# Patient Record
Sex: FEMALE | Race: WHITE | NOT HISPANIC OR LATINO | Employment: UNEMPLOYED
[De-identification: names, ages, dates, MRNs, and addresses within clinical notes are randomized per-mention and may not be internally consistent; named-entity substitution may affect disease eponyms.]

---

## 2017-02-17 ENCOUNTER — CHARTING TRANS (OUTPATIENT)
Dept: OTHER | Age: 57
End: 2017-02-17

## 2017-02-18 ENCOUNTER — LAB SERVICES (OUTPATIENT)
Dept: OTHER | Age: 57
End: 2017-02-18

## 2017-02-19 ENCOUNTER — CHARTING TRANS (OUTPATIENT)
Dept: OTHER | Age: 57
End: 2017-02-19

## 2017-02-19 LAB
CHOLEST SERPL-MCNC: 176 MG/DL
CHOLEST/HDLC SERPL: 3.7
HDLC SERPL-MCNC: 48 MG/DL
LDLC SERPL CALC-MCNC: 111 MG/DL
LENGTH OF FAST TIME PATIENT: 12 HRS
NONHDLC SERPL-MCNC: 128 MG/DL
TRIGL SERPL-MCNC: 83 MG/DL

## 2017-06-05 ENCOUNTER — IMAGING SERVICES (OUTPATIENT)
Dept: OTHER | Age: 57
End: 2017-06-05

## 2017-06-05 ENCOUNTER — HOSPITAL (OUTPATIENT)
Dept: OTHER | Age: 57
End: 2017-06-05
Attending: FAMILY MEDICINE

## 2017-10-20 ENCOUNTER — LAB SERVICES (OUTPATIENT)
Dept: OTHER | Age: 57
End: 2017-10-20

## 2017-10-20 ENCOUNTER — CHARTING TRANS (OUTPATIENT)
Dept: OTHER | Age: 57
End: 2017-10-20

## 2017-10-24 ENCOUNTER — CHARTING TRANS (OUTPATIENT)
Dept: OTHER | Age: 57
End: 2017-10-24

## 2017-10-29 ENCOUNTER — CHARTING TRANS (OUTPATIENT)
Dept: OTHER | Age: 57
End: 2017-10-29

## 2017-11-10 LAB
25(OH)D3+25(OH)D2 SERPL-MCNC: 52.5 NG/ML (ref 30–100)
ALBUMIN SERPL-MCNC: 3.6 G/DL (ref 3.6–5.1)
ALBUMIN/GLOB SERPL: 1 (ref 1–2.4)
ALP SERPL-CCNC: 101 UNITS/L (ref 45–117)
ALT SERPL-CCNC: 38 UNITS/L
ANION GAP SERPL CALC-SCNC: 10 MMOL/L (ref 10–20)
AST SERPL-CCNC: 21 UNITS/L
BASOPHILS # BLD: 0 K/MCL (ref 0–0.3)
BASOPHILS NFR BLD: 0 %
BILIRUB SERPL-MCNC: 0.3 MG/DL (ref 0.2–1)
BUN SERPL-MCNC: 21 MG/DL (ref 6–20)
BUN/CREAT SERPL: 24 (ref 7–25)
CALCIUM SERPL-MCNC: 9.8 MG/DL (ref 8.4–10.2)
CHLORIDE SERPL-SCNC: 105 MMOL/L (ref 98–107)
CHOLEST SERPL-MCNC: 162 MG/DL
CHOLEST/HDLC SERPL: 3.2
CO2 SERPL-SCNC: 31 MMOL/L (ref 21–32)
CREAT SERPL-MCNC: 0.87 MG/DL (ref 0.51–0.95)
DIFFERENTIAL METHOD BLD: ABNORMAL
EOSINOPHIL # BLD: 0.2 K/MCL (ref 0.1–0.5)
EOSINOPHIL NFR BLD: 3 %
ERYTHROCYTE [DISTWIDTH] IN BLOOD: 13.2 % (ref 11–15)
GLOBULIN SER-MCNC: 3.5 G/DL (ref 2–4)
GLUCOSE SERPL-MCNC: 94 MG/DL (ref 65–99)
HDLC SERPL-MCNC: 50 MG/DL
HEMATOCRIT: 39.2 % (ref 36–46.5)
HEMOGLOBIN: 12.8 G/DL (ref 12–15.5)
LDLC SERPL CALC-MCNC: 93 MG/DL
LENGTH OF FAST TIME PATIENT: 12 HRS
LENGTH OF FAST TIME PATIENT: 12 HRS
LYMPHOCYTES # BLD: 1.8 K/MCL (ref 1–4)
LYMPHOCYTES NFR BLD: 34 %
MAGNESIUM SERPL-MCNC: 2 MG/DL (ref 1.7–2.4)
MEAN CORPUSCULAR HEMOGLOBIN: 30 PG (ref 26–34)
MEAN CORPUSCULAR HGB CONC: 32.7 G/DL (ref 32–36.5)
MEAN CORPUSCULAR VOLUME: 92 FL (ref 78–100)
MONOCYTES # BLD: 0.4 K/MCL (ref 0.3–0.9)
MONOCYTES NFR BLD: 8 %
NEUTROPHILS # BLD: 2.9 K/MCL (ref 1.8–7.7)
NEUTROPHILS NFR BLD: 55 %
NONHDLC SERPL-MCNC: 112 MG/DL
PAP WITH HIGH RISK HPV: NORMAL
PLATELET COUNT: 239 K/MCL (ref 140–450)
POTASSIUM SERPL-SCNC: 4.9 MMOL/L (ref 3.4–5.1)
RED CELL COUNT: 4.26 MIL/MCL (ref 4–5.2)
SODIUM SERPL-SCNC: 141 MMOL/L (ref 135–145)
TOTAL PROTEIN: 7.1 G/DL (ref 6.4–8.2)
TRIGL SERPL-MCNC: 96 MG/DL
TSH SERPL-ACNC: 1.74 MCUNITS/ML (ref 0.35–5)
URIC ACID: 5.4 MG/DL (ref 2.6–5.9)
WHITE BLOOD COUNT: 5.3 K/MCL (ref 4.2–11)

## 2017-12-02 ENCOUNTER — HOSPITAL (OUTPATIENT)
Dept: OTHER | Age: 57
End: 2017-12-02
Attending: FAMILY MEDICINE

## 2017-12-02 ENCOUNTER — IMAGING SERVICES (OUTPATIENT)
Dept: OTHER | Age: 57
End: 2017-12-02

## 2018-09-27 ENCOUNTER — MYAURORA ACCOUNT LINK (OUTPATIENT)
Dept: OTHER | Age: 58
End: 2018-09-27

## 2018-09-27 ENCOUNTER — CHARTING TRANS (OUTPATIENT)
Dept: OTHER | Age: 58
End: 2018-09-27

## 2018-10-04 ENCOUNTER — CHARTING TRANS (OUTPATIENT)
Dept: OTHER | Age: 58
End: 2018-10-04

## 2018-10-04 LAB — PAP WITH HIGH RISK HPV: NORMAL

## 2018-10-11 ENCOUNTER — LAB SERVICES (OUTPATIENT)
Dept: OTHER | Age: 58
End: 2018-10-11

## 2018-10-11 ENCOUNTER — HOSPITAL (OUTPATIENT)
Dept: OTHER | Age: 58
End: 2018-10-11

## 2018-10-11 ENCOUNTER — IMAGING SERVICES (OUTPATIENT)
Dept: OTHER | Age: 58
End: 2018-10-11

## 2018-10-12 ENCOUNTER — CHARTING TRANS (OUTPATIENT)
Dept: OTHER | Age: 58
End: 2018-10-12

## 2018-10-12 LAB
ALBUMIN SERPL-MCNC: 3.9 G/DL (ref 3.6–5.1)
ALBUMIN/GLOB SERPL: 1.1 (ref 1–2.4)
ALP SERPL-CCNC: 101 UNITS/L (ref 45–117)
ALT SERPL-CCNC: 44 UNITS/L
ANION GAP SERPL CALC-SCNC: 11 MMOL/L (ref 10–20)
AST SERPL-CCNC: 25 UNITS/L
BASOPHILS # BLD: 0 K/MCL (ref 0–0.3)
BASOPHILS NFR BLD: 1 %
BILIRUB SERPL-MCNC: 0.5 MG/DL (ref 0.2–1)
BUN SERPL-MCNC: 25 MG/DL (ref 6–20)
BUN/CREAT SERPL: 26 (ref 7–25)
CALCIUM SERPL-MCNC: 9.4 MG/DL (ref 8.4–10.2)
CHLORIDE SERPL-SCNC: 102 MMOL/L (ref 98–107)
CHOLEST SERPL-MCNC: 186 MG/DL
CHOLEST/HDLC SERPL: 4.1
CO2 SERPL-SCNC: 30 MMOL/L (ref 21–32)
CREAT SERPL-MCNC: 0.98 MG/DL (ref 0.51–0.95)
DIFFERENTIAL METHOD BLD: ABNORMAL
EOSINOPHIL # BLD: 0.2 K/MCL (ref 0.1–0.5)
EOSINOPHIL NFR BLD: 3 %
ERYTHROCYTE [DISTWIDTH] IN BLOOD: 12.8 % (ref 11–15)
GLOBULIN SER-MCNC: 3.6 G/DL (ref 2–4)
GLUCOSE SERPL-MCNC: 84 MG/DL (ref 65–99)
HDLC SERPL-MCNC: 45 MG/DL
HEMATOCRIT: 38.8 % (ref 36–46.5)
HEMOGLOBIN: 12.4 G/DL (ref 12–15.5)
IMM GRANULOCYTES # BLD AUTO: 0 K/MCL (ref 0–0.2)
IMM GRANULOCYTES NFR BLD: 0 %
LDLC SERPL CALC-MCNC: 122 MG/DL
LENGTH OF FAST TIME PATIENT: 12 HRS
LENGTH OF FAST TIME PATIENT: 12 HRS
LYMPHOCYTES # BLD: 1.7 K/MCL (ref 1–4)
LYMPHOCYTES NFR BLD: 32 %
MEAN CORPUSCULAR HEMOGLOBIN: 29.7 PG (ref 26–34)
MEAN CORPUSCULAR HGB CONC: 32 G/DL (ref 32–36.5)
MEAN CORPUSCULAR VOLUME: 92.8 FL (ref 78–100)
MONOCYTES # BLD: 0.4 K/MCL (ref 0.3–0.9)
MONOCYTES NFR BLD: 8 %
NEUTROPHILS # BLD: 3 K/MCL (ref 1.8–7.7)
NEUTROPHILS NFR BLD: 56 %
NONHDLC SERPL-MCNC: 141 MG/DL
NRBC (NRBCRE): 0 /100 WBC
PLATELET COUNT: 262 K/MCL (ref 140–450)
POTASSIUM SERPL-SCNC: 4.2 MMOL/L (ref 3.4–5.1)
RED CELL COUNT: 4.18 MIL/MCL (ref 4–5.2)
SODIUM SERPL-SCNC: 139 MMOL/L (ref 135–145)
TOTAL PROTEIN: 7.5 G/DL (ref 6.4–8.2)
TRIGL SERPL-MCNC: 97 MG/DL
TSH SERPL-ACNC: 1.79 MCUNITS/ML (ref 0.35–5)
WHITE BLOOD COUNT: 5.3 K/MCL (ref 4.2–11)

## 2018-11-02 VITALS
WEIGHT: 158.29 LBS | HEIGHT: 64 IN | HEART RATE: 56 BPM | SYSTOLIC BLOOD PRESSURE: 122 MMHG | TEMPERATURE: 98.1 F | OXYGEN SATURATION: 96 % | BODY MASS INDEX: 27.02 KG/M2 | RESPIRATION RATE: 16 BRPM | DIASTOLIC BLOOD PRESSURE: 74 MMHG

## 2018-11-27 VITALS
TEMPERATURE: 98.3 F | RESPIRATION RATE: 16 BRPM | SYSTOLIC BLOOD PRESSURE: 110 MMHG | HEIGHT: 64 IN | HEART RATE: 72 BPM | WEIGHT: 159 LBS | DIASTOLIC BLOOD PRESSURE: 72 MMHG | BODY MASS INDEX: 27.14 KG/M2

## 2018-12-07 ENCOUNTER — HOSPITAL (OUTPATIENT)
Dept: OTHER | Age: 58
End: 2018-12-07
Attending: FAMILY MEDICINE

## 2019-01-01 ENCOUNTER — EXTERNAL RECORD (OUTPATIENT)
Dept: HEALTH INFORMATION MANAGEMENT | Facility: OTHER | Age: 59
End: 2019-01-01

## 2019-07-31 ENCOUNTER — TELEPHONE (OUTPATIENT)
Dept: SCHEDULING | Age: 59
End: 2019-07-31

## 2019-09-03 ENCOUNTER — LAB SERVICES (OUTPATIENT)
Dept: LAB | Age: 59
End: 2019-09-03

## 2019-09-03 ENCOUNTER — OFFICE VISIT (OUTPATIENT)
Dept: FAMILY MEDICINE | Age: 59
End: 2019-09-03

## 2019-09-03 VITALS
RESPIRATION RATE: 16 BRPM | DIASTOLIC BLOOD PRESSURE: 64 MMHG | WEIGHT: 157 LBS | HEART RATE: 68 BPM | BODY MASS INDEX: 26.8 KG/M2 | HEIGHT: 64 IN | TEMPERATURE: 97.5 F | SYSTOLIC BLOOD PRESSURE: 110 MMHG

## 2019-09-03 DIAGNOSIS — Z12.39 SCREENING FOR BREAST CANCER: Primary | ICD-10-CM

## 2019-09-03 DIAGNOSIS — I10 ESSENTIAL HYPERTENSION: ICD-10-CM

## 2019-09-03 DIAGNOSIS — Z12.11 SCREENING FOR COLON CANCER: ICD-10-CM

## 2019-09-03 DIAGNOSIS — Z00.00 ENCOUNTER FOR GENERAL ADULT MEDICAL EXAMINATION W/O ABNORMAL FINDINGS: ICD-10-CM

## 2019-09-03 LAB
ALBUMIN SERPL-MCNC: 3.5 G/DL (ref 3.6–5.1)
ALBUMIN/GLOB SERPL: 1 {RATIO} (ref 1–2.4)
ALP SERPL-CCNC: 90 UNITS/L (ref 45–117)
ALT SERPL-CCNC: 43 UNITS/L
ANION GAP SERPL CALC-SCNC: 11 MMOL/L (ref 10–20)
AST SERPL-CCNC: 27 UNITS/L
BASOPHILS # BLD AUTO: 0 K/MCL (ref 0–0.3)
BASOPHILS NFR BLD AUTO: 1 %
BILIRUB SERPL-MCNC: 0.5 MG/DL (ref 0.2–1)
BUN SERPL-MCNC: 19 MG/DL (ref 6–20)
BUN/CREAT SERPL: 24 (ref 7–25)
CALCIUM SERPL-MCNC: 9.4 MG/DL (ref 8.4–10.2)
CHLORIDE SERPL-SCNC: 104 MMOL/L (ref 98–107)
CHOLEST SERPL-MCNC: 188 MG/DL
CHOLEST/HDLC SERPL: 3.8 {RATIO}
CO2 SERPL-SCNC: 29 MMOL/L (ref 21–32)
CREAT SERPL-MCNC: 0.8 MG/DL (ref 0.51–0.95)
DIFFERENTIAL METHOD BLD: NORMAL
EOSINOPHIL # BLD AUTO: 0.2 K/MCL (ref 0.1–0.5)
EOSINOPHIL NFR SPEC: 3 %
ERYTHROCYTE [DISTWIDTH] IN BLOOD: 12.8 % (ref 11–15)
FASTING STATUS PATIENT QL REPORTED: 12 HRS
GLOBULIN SER-MCNC: 3.4 G/DL (ref 2–4)
GLUCOSE SERPL-MCNC: 81 MG/DL (ref 65–99)
HCT VFR BLD CALC: 39.5 % (ref 36–46.5)
HDLC SERPL-MCNC: 50 MG/DL
HGB BLD-MCNC: 12.9 G/DL (ref 12–15.5)
IMM GRANULOCYTES # BLD AUTO: 0 K/MCL (ref 0–0.2)
IMM GRANULOCYTES NFR BLD: 0 %
LDLC SERPL-MCNC: 120 MG/DL
LENGTH OF FAST TIME PATIENT: 12 HRS
LYMPHOCYTES # BLD MANUAL: 1.5 K/MCL (ref 1–4)
LYMPHOCYTES NFR BLD MANUAL: 25 %
MCH RBC QN AUTO: 30.7 PG (ref 26–34)
MCHC RBC AUTO-ENTMCNC: 32.7 G/DL (ref 32–36.5)
MCV RBC AUTO: 94 FL (ref 78–100)
MONOCYTES # BLD MANUAL: 0.5 K/MCL (ref 0.3–0.9)
MONOCYTES NFR BLD MANUAL: 8 %
NEUTROPHILS # BLD: 3.8 K/MCL (ref 1.8–7.7)
NEUTROPHILS NFR BLD AUTO: 63 %
NONHDLC SERPL-MCNC: 138 MG/DL
NRBC BLD MANUAL-RTO: 0 /100 WBC
PLATELET # BLD: 239 K/MCL (ref 140–450)
POTASSIUM SERPL-SCNC: 4.2 MMOL/L (ref 3.4–5.1)
PROT SERPL-MCNC: 6.9 G/DL (ref 6.4–8.2)
RBC # BLD: 4.2 MIL/MCL (ref 4–5.2)
SODIUM SERPL-SCNC: 140 MMOL/L (ref 135–145)
TRIGL SERPL-MCNC: 88 MG/DL
TSH SERPL-ACNC: 1.7 MCUNITS/ML (ref 0.35–5)
WBC # BLD: 6 K/MCL (ref 4.2–11)

## 2019-09-03 PROCEDURE — 3074F SYST BP LT 130 MM HG: CPT | Performed by: FAMILY MEDICINE

## 2019-09-03 PROCEDURE — 99396 PREV VISIT EST AGE 40-64: CPT | Performed by: FAMILY MEDICINE

## 2019-09-03 PROCEDURE — 36415 COLL VENOUS BLD VENIPUNCTURE: CPT | Performed by: FAMILY MEDICINE

## 2019-09-03 PROCEDURE — 3078F DIAST BP <80 MM HG: CPT | Performed by: FAMILY MEDICINE

## 2019-09-03 PROCEDURE — 80050 GENERAL HEALTH PANEL: CPT | Performed by: FAMILY MEDICINE

## 2019-09-03 PROCEDURE — 80061 LIPID PANEL: CPT | Performed by: FAMILY MEDICINE

## 2019-09-03 RX ORDER — ATENOLOL 50 MG/1
TABLET ORAL
COMMUNITY
Start: 2018-09-27 | End: 2019-09-03 | Stop reason: SDUPTHER

## 2019-09-03 RX ORDER — PYRIDOXINE HCL (VITAMIN B6) 100 MG
TABLET ORAL
COMMUNITY

## 2019-09-03 RX ORDER — ATENOLOL 50 MG/1
50 TABLET ORAL DAILY
Qty: 90 TABLET | Refills: 3 | Status: SHIPPED | OUTPATIENT
Start: 2019-09-03 | End: 2019-11-25 | Stop reason: SDUPTHER

## 2019-09-03 RX ORDER — LISINOPRIL AND HYDROCHLOROTHIAZIDE 20; 12.5 MG/1; MG/1
1 TABLET ORAL DAILY
Qty: 90 TABLET | Refills: 3 | Status: SHIPPED | OUTPATIENT
Start: 2019-09-03 | End: 2019-11-25 | Stop reason: SDUPTHER

## 2019-09-03 RX ORDER — LISINOPRIL AND HYDROCHLOROTHIAZIDE 20; 12.5 MG/1; MG/1
TABLET ORAL DAILY
COMMUNITY
Start: 2018-09-27 | End: 2019-09-03 | Stop reason: SDUPTHER

## 2019-09-03 RX ORDER — FAMOTIDINE 20 MG
TABLET ORAL
COMMUNITY

## 2019-09-03 ASSESSMENT — ENCOUNTER SYMPTOMS
ABDOMINAL DISTENTION: 1
SHORTNESS OF BREATH: 0
BLOOD IN STOOL: 0

## 2019-09-03 ASSESSMENT — PATIENT HEALTH QUESTIONNAIRE - PHQ9
SUM OF ALL RESPONSES TO PHQ9 QUESTIONS 1 AND 2: 0
SUM OF ALL RESPONSES TO PHQ9 QUESTIONS 1 AND 2: 0
2. FEELING DOWN, DEPRESSED OR HOPELESS: NOT AT ALL
1. LITTLE INTEREST OR PLEASURE IN DOING THINGS: NOT AT ALL

## 2019-09-04 ENCOUNTER — TELEPHONE (OUTPATIENT)
Dept: SCHEDULING | Age: 59
End: 2019-09-04

## 2019-10-02 ENCOUNTER — E-ADVICE (OUTPATIENT)
Dept: FAMILY MEDICINE | Age: 59
End: 2019-10-02

## 2019-11-25 DIAGNOSIS — I10 ESSENTIAL HYPERTENSION: ICD-10-CM

## 2019-11-25 RX ORDER — LISINOPRIL AND HYDROCHLOROTHIAZIDE 20; 12.5 MG/1; MG/1
TABLET ORAL
Qty: 9000 TABLET | Refills: 4 | Status: SHIPPED | OUTPATIENT
Start: 2019-11-25 | End: 2021-02-17

## 2019-11-25 RX ORDER — ATENOLOL 50 MG/1
TABLET ORAL
Qty: 90 TABLET | Refills: 4 | Status: SHIPPED | OUTPATIENT
Start: 2019-11-25 | End: 2021-02-17

## 2019-12-28 ENCOUNTER — HOSPITAL (OUTPATIENT)
Dept: OTHER | Age: 59
End: 2019-12-28
Attending: FAMILY MEDICINE

## 2020-01-01 ENCOUNTER — EXTERNAL RECORD (OUTPATIENT)
Dept: HEALTH INFORMATION MANAGEMENT | Facility: OTHER | Age: 60
End: 2020-01-01

## 2020-09-08 ENCOUNTER — TELEPHONE (OUTPATIENT)
Dept: SCHEDULING | Age: 60
End: 2020-09-08

## 2020-10-02 ENCOUNTER — TELEPHONE (OUTPATIENT)
Dept: SCHEDULING | Age: 60
End: 2020-10-02

## 2020-10-02 ENCOUNTER — OFFICE VISIT (OUTPATIENT)
Dept: FAMILY MEDICINE | Age: 60
End: 2020-10-02

## 2020-10-02 VITALS
BODY MASS INDEX: 27.49 KG/M2 | WEIGHT: 161 LBS | HEIGHT: 64 IN | TEMPERATURE: 96.8 F | RESPIRATION RATE: 12 BRPM | DIASTOLIC BLOOD PRESSURE: 74 MMHG | SYSTOLIC BLOOD PRESSURE: 120 MMHG | HEART RATE: 60 BPM

## 2020-10-02 DIAGNOSIS — Z00.00 ENCOUNTER FOR GENERAL ADULT MEDICAL EXAMINATION W/O ABNORMAL FINDINGS: ICD-10-CM

## 2020-10-02 DIAGNOSIS — I10 ESSENTIAL HYPERTENSION: Primary | ICD-10-CM

## 2020-10-02 DIAGNOSIS — Z12.31 ENCOUNTER FOR SCREENING MAMMOGRAM FOR MALIGNANT NEOPLASM OF BREAST: ICD-10-CM

## 2020-10-02 PROCEDURE — 3078F DIAST BP <80 MM HG: CPT | Performed by: FAMILY MEDICINE

## 2020-10-02 PROCEDURE — 90715 TDAP VACCINE 7 YRS/> IM: CPT

## 2020-10-02 PROCEDURE — 3074F SYST BP LT 130 MM HG: CPT | Performed by: FAMILY MEDICINE

## 2020-10-02 PROCEDURE — 90471 IMMUNIZATION ADMIN: CPT

## 2020-10-02 PROCEDURE — 99396 PREV VISIT EST AGE 40-64: CPT | Performed by: FAMILY MEDICINE

## 2020-10-02 ASSESSMENT — ENCOUNTER SYMPTOMS
NERVOUS/ANXIOUS: 1
SHORTNESS OF BREATH: 0

## 2020-10-02 ASSESSMENT — PATIENT HEALTH QUESTIONNAIRE - PHQ9
CLINICAL INTERPRETATION OF PHQ2 SCORE: NO FURTHER SCREENING NEEDED
CLINICAL INTERPRETATION OF PHQ9 SCORE: NO FURTHER SCREENING NEEDED
1. LITTLE INTEREST OR PLEASURE IN DOING THINGS: NOT AT ALL
SUM OF ALL RESPONSES TO PHQ9 QUESTIONS 1 AND 2: 0
SUM OF ALL RESPONSES TO PHQ9 QUESTIONS 1 AND 2: 0
2. FEELING DOWN, DEPRESSED OR HOPELESS: NOT AT ALL

## 2020-10-03 ENCOUNTER — LAB SERVICES (OUTPATIENT)
Dept: LAB | Age: 60
End: 2020-10-03

## 2020-10-03 DIAGNOSIS — Z00.00 ENCOUNTER FOR GENERAL ADULT MEDICAL EXAMINATION W/O ABNORMAL FINDINGS: ICD-10-CM

## 2020-10-03 LAB
BASOPHILS # BLD: 0 K/MCL (ref 0–0.3)
BASOPHILS NFR BLD: 0 %
DIFFERENTIAL METHOD BLD: NORMAL
EOSINOPHIL # BLD: 0.1 K/MCL (ref 0.1–0.5)
EOSINOPHIL NFR BLD: 2 %
ERYTHROCYTE [DISTWIDTH] IN BLOOD: 12.6 % (ref 11–15)
HCT VFR BLD CALC: 39.9 % (ref 36–46.5)
HGB BLD-MCNC: 13.1 G/DL (ref 12–15.5)
IMM GRANULOCYTES # BLD AUTO: 0 K/MCL (ref 0–0.2)
IMM GRANULOCYTES NFR BLD: 0 %
LYMPHOCYTES # BLD: 1.6 K/MCL (ref 1–4)
LYMPHOCYTES NFR BLD: 29 %
MCH RBC QN AUTO: 30.4 PG (ref 26–34)
MCHC RBC AUTO-ENTMCNC: 32.8 G/DL (ref 32–36.5)
MCV RBC AUTO: 92.6 FL (ref 78–100)
MONOCYTES # BLD: 0.5 K/MCL (ref 0.3–0.9)
MONOCYTES NFR BLD: 9 %
NEUTROPHILS # BLD: 3.2 K/MCL (ref 1.8–7.7)
NEUTROPHILS NFR BLD: 60 %
NRBC BLD MANUAL-RTO: 0 /100 WBC
PLATELET # BLD: 257 K/MCL (ref 140–450)
RBC # BLD: 4.31 MIL/MCL (ref 4–5.2)
WBC # BLD: 5.4 K/MCL (ref 4.2–11)

## 2020-10-03 PROCEDURE — 36415 COLL VENOUS BLD VENIPUNCTURE: CPT | Performed by: FAMILY MEDICINE

## 2020-10-03 PROCEDURE — 80061 LIPID PANEL: CPT | Performed by: FAMILY MEDICINE

## 2020-10-03 PROCEDURE — 86803 HEPATITIS C AB TEST: CPT | Performed by: FAMILY MEDICINE

## 2020-10-03 PROCEDURE — 85025 COMPLETE CBC W/AUTO DIFF WBC: CPT | Performed by: FAMILY MEDICINE

## 2020-10-03 PROCEDURE — 80053 COMPREHEN METABOLIC PANEL: CPT | Performed by: FAMILY MEDICINE

## 2020-10-04 LAB
ALBUMIN SERPL-MCNC: 3.6 G/DL (ref 3.6–5.1)
ALBUMIN/GLOB SERPL: 1 {RATIO} (ref 1–2.4)
ALP SERPL-CCNC: 104 UNITS/L (ref 45–117)
ALT SERPL-CCNC: 45 UNITS/L
ANION GAP SERPL CALC-SCNC: 8 MMOL/L (ref 10–20)
AST SERPL-CCNC: 32 UNITS/L
BILIRUB SERPL-MCNC: 0.5 MG/DL (ref 0.2–1)
BUN SERPL-MCNC: 22 MG/DL (ref 6–20)
BUN/CREAT SERPL: 28 (ref 7–25)
CALCIUM SERPL-MCNC: 9.6 MG/DL (ref 8.4–10.2)
CHLORIDE SERPL-SCNC: 103 MMOL/L (ref 98–107)
CHOLEST SERPL-MCNC: 177 MG/DL
CHOLEST/HDLC SERPL: 3 {RATIO}
CO2 SERPL-SCNC: 31 MMOL/L (ref 21–32)
CREAT SERPL-MCNC: 0.8 MG/DL (ref 0.51–0.95)
GLOBULIN SER-MCNC: 3.6 G/DL (ref 2–4)
GLUCOSE SERPL-MCNC: 77 MG/DL (ref 65–99)
HCV AB SER QL: NEGATIVE
HDLC SERPL-MCNC: 59 MG/DL
LDLC SERPL CALC-MCNC: 105 MG/DL
LENGTH OF FAST TIME PATIENT: 12 HRS
LENGTH OF FAST TIME PATIENT: 12 HRS
NONHDLC SERPL-MCNC: 118 MG/DL
POTASSIUM SERPL-SCNC: 4.5 MMOL/L (ref 3.4–5.1)
PROT SERPL-MCNC: 7.2 G/DL (ref 6.4–8.2)
SODIUM SERPL-SCNC: 138 MMOL/L (ref 135–145)
TRIGL SERPL-MCNC: 65 MG/DL

## 2020-10-30 ENCOUNTER — IMAGING SERVICES (OUTPATIENT)
Dept: MAMMOGRAPHY | Age: 60
End: 2020-10-30
Attending: FAMILY MEDICINE

## 2020-10-30 DIAGNOSIS — Z12.31 ENCOUNTER FOR SCREENING MAMMOGRAM FOR MALIGNANT NEOPLASM OF BREAST: ICD-10-CM

## 2020-10-30 PROCEDURE — 77063 BREAST TOMOSYNTHESIS BI: CPT | Performed by: RADIOLOGY

## 2020-10-30 PROCEDURE — 77067 SCR MAMMO BI INCL CAD: CPT | Performed by: RADIOLOGY

## 2021-02-16 DIAGNOSIS — I10 ESSENTIAL HYPERTENSION: ICD-10-CM

## 2021-02-17 RX ORDER — ATENOLOL 50 MG/1
TABLET ORAL
Qty: 90 TABLET | Refills: 2 | Status: SHIPPED | OUTPATIENT
Start: 2021-02-17 | End: 2021-11-15

## 2021-02-17 RX ORDER — LISINOPRIL AND HYDROCHLOROTHIAZIDE 20; 12.5 MG/1; MG/1
TABLET ORAL
Qty: 90 TABLET | Refills: 2 | Status: SHIPPED | OUTPATIENT
Start: 2021-02-17 | End: 2021-11-15

## 2021-03-31 ENCOUNTER — IMMUNIZATION (OUTPATIENT)
Dept: LAB | Age: 61
End: 2021-03-31

## 2021-03-31 DIAGNOSIS — Z23 NEED FOR VACCINATION: Primary | ICD-10-CM

## 2021-03-31 PROCEDURE — 91300 COVID 19 PFIZER-BIONTECH: CPT

## 2021-03-31 PROCEDURE — 0001A COVID 19 PFIZER-BIONTECH: CPT

## 2021-04-26 ENCOUNTER — IMMUNIZATION (OUTPATIENT)
Dept: LAB | Age: 61
End: 2021-04-26
Attending: HOSPITALIST

## 2021-04-26 DIAGNOSIS — Z23 NEED FOR VACCINATION: Primary | ICD-10-CM

## 2021-04-26 PROCEDURE — 0002A COVID 19 PFIZER-BIONTECH: CPT

## 2021-04-26 PROCEDURE — 91300 COVID 19 PFIZER-BIONTECH: CPT

## 2021-09-29 ENCOUNTER — TELEPHONE (OUTPATIENT)
Dept: SCHEDULING | Age: 61
End: 2021-09-29

## 2021-10-11 ENCOUNTER — OFFICE VISIT (OUTPATIENT)
Dept: FAMILY MEDICINE | Age: 61
End: 2021-10-11

## 2021-10-11 VITALS
DIASTOLIC BLOOD PRESSURE: 64 MMHG | TEMPERATURE: 97.8 F | BODY MASS INDEX: 26.12 KG/M2 | HEART RATE: 62 BPM | SYSTOLIC BLOOD PRESSURE: 100 MMHG | WEIGHT: 153 LBS | RESPIRATION RATE: 12 BRPM | HEIGHT: 64 IN

## 2021-10-11 DIAGNOSIS — Z12.4 SCREENING FOR CERVICAL CANCER: ICD-10-CM

## 2021-10-11 DIAGNOSIS — Z23 NEED FOR SHINGLES VACCINE: ICD-10-CM

## 2021-10-11 DIAGNOSIS — Z12.11 SCREENING FOR COLON CANCER: ICD-10-CM

## 2021-10-11 DIAGNOSIS — I10 ESSENTIAL HYPERTENSION: ICD-10-CM

## 2021-10-11 DIAGNOSIS — Z12.31 ENCOUNTER FOR SCREENING MAMMOGRAM FOR MALIGNANT NEOPLASM OF BREAST: ICD-10-CM

## 2021-10-11 DIAGNOSIS — Z78.0 MENOPAUSE: ICD-10-CM

## 2021-10-11 DIAGNOSIS — Z23 NEED FOR VACCINATION: Primary | ICD-10-CM

## 2021-10-11 DIAGNOSIS — Z00.00 ENCOUNTER FOR GENERAL ADULT MEDICAL EXAMINATION W/O ABNORMAL FINDINGS: ICD-10-CM

## 2021-10-11 PROCEDURE — 3078F DIAST BP <80 MM HG: CPT | Performed by: FAMILY MEDICINE

## 2021-10-11 PROCEDURE — 87624 HPV HI-RISK TYP POOLED RSLT: CPT | Performed by: CLINICAL MEDICAL LABORATORY

## 2021-10-11 PROCEDURE — 3074F SYST BP LT 130 MM HG: CPT | Performed by: FAMILY MEDICINE

## 2021-10-11 PROCEDURE — 90750 HZV VACC RECOMBINANT IM: CPT

## 2021-10-11 PROCEDURE — 90686 IIV4 VACC NO PRSV 0.5 ML IM: CPT

## 2021-10-11 PROCEDURE — 88175 CYTOPATH C/V AUTO FLUID REDO: CPT | Performed by: CLINICAL MEDICAL LABORATORY

## 2021-10-11 PROCEDURE — 90472 IMMUNIZATION ADMIN EACH ADD: CPT

## 2021-10-11 PROCEDURE — 99396 PREV VISIT EST AGE 40-64: CPT | Performed by: FAMILY MEDICINE

## 2021-10-11 PROCEDURE — 90471 IMMUNIZATION ADMIN: CPT

## 2021-10-11 ASSESSMENT — ENCOUNTER SYMPTOMS
SINUS PRESSURE: 0
TROUBLE SWALLOWING: 0
SINUS PAIN: 0
FEVER: 0
DIARRHEA: 0
LIGHT-HEADEDNESS: 0
CONSTIPATION: 0
NUMBNESS: 0
NERVOUS/ANXIOUS: 0
ABDOMINAL DISTENTION: 0
ABDOMINAL PAIN: 0
SHORTNESS OF BREATH: 0
DIZZINESS: 0
BLOOD IN STOOL: 0
CHILLS: 0

## 2021-10-11 ASSESSMENT — PATIENT HEALTH QUESTIONNAIRE - PHQ9
CLINICAL INTERPRETATION OF PHQ2 SCORE: NO FURTHER SCREENING NEEDED
CLINICAL INTERPRETATION OF PHQ9 SCORE: NO FURTHER SCREENING NEEDED
SUM OF ALL RESPONSES TO PHQ9 QUESTIONS 1 AND 2: 0
SUM OF ALL RESPONSES TO PHQ9 QUESTIONS 1 AND 2: 0
1. LITTLE INTEREST OR PLEASURE IN DOING THINGS: NOT AT ALL
2. FEELING DOWN, DEPRESSED OR HOPELESS: NOT AT ALL

## 2021-10-16 ENCOUNTER — LAB SERVICES (OUTPATIENT)
Dept: LAB | Age: 61
End: 2021-10-16

## 2021-10-16 DIAGNOSIS — Z00.00 ENCOUNTER FOR GENERAL ADULT MEDICAL EXAMINATION W/O ABNORMAL FINDINGS: ICD-10-CM

## 2021-10-16 PROCEDURE — 80053 COMPREHEN METABOLIC PANEL: CPT | Performed by: FAMILY MEDICINE

## 2021-10-16 PROCEDURE — 80061 LIPID PANEL: CPT | Performed by: FAMILY MEDICINE

## 2021-10-16 PROCEDURE — 36415 COLL VENOUS BLD VENIPUNCTURE: CPT | Performed by: FAMILY MEDICINE

## 2021-10-17 LAB
ALBUMIN SERPL-MCNC: 3.6 G/DL (ref 3.6–5.1)
ALBUMIN/GLOB SERPL: 1 {RATIO} (ref 1–2.4)
ALP SERPL-CCNC: 99 UNITS/L (ref 45–117)
ALT SERPL-CCNC: 41 UNITS/L
ANION GAP SERPL CALC-SCNC: 8 MMOL/L (ref 10–20)
AST SERPL-CCNC: 23 UNITS/L
BILIRUB SERPL-MCNC: 0.4 MG/DL (ref 0.2–1)
BUN SERPL-MCNC: 25 MG/DL (ref 6–20)
BUN/CREAT SERPL: 25 (ref 7–25)
CALCIUM SERPL-MCNC: 9.6 MG/DL (ref 8.4–10.2)
CHLORIDE SERPL-SCNC: 103 MMOL/L (ref 98–107)
CHOLEST SERPL-MCNC: 174 MG/DL
CHOLEST/HDLC SERPL: 4 {RATIO}
CO2 SERPL-SCNC: 31 MMOL/L (ref 21–32)
CREAT SERPL-MCNC: 1 MG/DL (ref 0.51–0.95)
FASTING DURATION TIME PATIENT: 12 HOURS (ref 0–999)
FASTING DURATION TIME PATIENT: 12 HOURS (ref 0–999)
GFR SERPLBLD BASED ON 1.73 SQ M-ARVRAT: 62 ML/MIN
GLOBULIN SER-MCNC: 3.7 G/DL (ref 2–4)
GLUCOSE SERPL-MCNC: 90 MG/DL (ref 70–99)
HDLC SERPL-MCNC: 43 MG/DL
LDLC SERPL CALC-MCNC: 111 MG/DL
NONHDLC SERPL-MCNC: 131 MG/DL
POTASSIUM SERPL-SCNC: 4.1 MMOL/L (ref 3.4–5.1)
PROT SERPL-MCNC: 7.3 G/DL (ref 6.4–8.2)
SODIUM SERPL-SCNC: 138 MMOL/L (ref 135–145)
TRIGL SERPL-MCNC: 99 MG/DL

## 2021-10-19 DIAGNOSIS — R94.4 DECREASED GFR: Primary | ICD-10-CM

## 2021-10-19 LAB
CASE RPRT: NORMAL
CYTOLOGY CVX/VAG STUDY: NORMAL
CYTOLOGY CVX/VAG STUDY: NORMAL
HPV16+18+45 E6+E7MRNA CVX NAA+PROBE: NEGATIVE
Lab: NORMAL
PAP EDUCATIONAL NOTE: NORMAL
SPECIMEN ADEQUACY: NORMAL

## 2021-10-22 ENCOUNTER — HOSPITAL ENCOUNTER (OUTPATIENT)
Dept: MAMMOGRAPHY | Age: 61
Discharge: HOME | End: 2021-10-22
Attending: FAMILY MEDICINE

## 2021-10-22 ENCOUNTER — HOSPITAL ENCOUNTER (OUTPATIENT)
Dept: GENERAL RADIOLOGY | Age: 61
Discharge: HOME | End: 2021-10-22
Attending: FAMILY MEDICINE

## 2021-10-22 DIAGNOSIS — Z78.0 MENOPAUSE: ICD-10-CM

## 2021-10-22 DIAGNOSIS — Z12.31 ENCOUNTER FOR SCREENING MAMMOGRAM FOR MALIGNANT NEOPLASM OF BREAST: ICD-10-CM

## 2021-10-22 PROCEDURE — 77080 DXA BONE DENSITY AXIAL: CPT

## 2021-10-22 PROCEDURE — 77063 BREAST TOMOSYNTHESIS BI: CPT

## 2021-10-26 DIAGNOSIS — I10 ESSENTIAL HYPERTENSION: Primary | ICD-10-CM

## 2021-11-14 DIAGNOSIS — I10 ESSENTIAL HYPERTENSION: ICD-10-CM

## 2021-11-15 RX ORDER — ATENOLOL 50 MG/1
TABLET ORAL
Qty: 90 TABLET | Refills: 3 | Status: SHIPPED | OUTPATIENT
Start: 2021-11-15 | End: 2022-01-20 | Stop reason: SDUPTHER

## 2021-11-15 RX ORDER — LISINOPRIL AND HYDROCHLOROTHIAZIDE 20; 12.5 MG/1; MG/1
TABLET ORAL
Qty: 90 TABLET | Refills: 3 | Status: SHIPPED | OUTPATIENT
Start: 2021-11-15 | End: 2022-01-20 | Stop reason: SDUPTHER

## 2021-12-28 ENCOUNTER — TELEPHONE (OUTPATIENT)
Dept: SCHEDULING | Age: 61
End: 2021-12-28

## 2021-12-29 ENCOUNTER — NURSE ONLY (OUTPATIENT)
Dept: FAMILY MEDICINE | Age: 61
End: 2021-12-29

## 2021-12-29 DIAGNOSIS — Z23 NEED FOR VACCINATION: Primary | ICD-10-CM

## 2021-12-29 PROCEDURE — 90471 IMMUNIZATION ADMIN: CPT | Performed by: FAMILY MEDICINE

## 2021-12-29 PROCEDURE — 90750 HZV VACC RECOMBINANT IM: CPT | Performed by: FAMILY MEDICINE

## 2022-01-01 ENCOUNTER — EXTERNAL RECORD (OUTPATIENT)
Dept: OTHER | Age: 62
End: 2022-01-01

## 2022-01-06 ENCOUNTER — TELEPHONE (OUTPATIENT)
Dept: SCHEDULING | Age: 62
End: 2022-01-06

## 2022-01-19 ENCOUNTER — E-ADVICE (OUTPATIENT)
Dept: FAMILY MEDICINE | Age: 62
End: 2022-01-19

## 2022-01-20 DIAGNOSIS — I10 ESSENTIAL HYPERTENSION: ICD-10-CM

## 2022-01-20 RX ORDER — LISINOPRIL AND HYDROCHLOROTHIAZIDE 20; 12.5 MG/1; MG/1
1 TABLET ORAL DAILY
Qty: 90 TABLET | Refills: 2 | Status: SHIPPED | OUTPATIENT
Start: 2022-01-20 | End: 2022-01-27 | Stop reason: SDUPTHER

## 2022-01-20 RX ORDER — ATENOLOL 50 MG/1
50 TABLET ORAL DAILY
Qty: 90 TABLET | Refills: 2 | Status: SHIPPED | OUTPATIENT
Start: 2022-01-20 | End: 2022-01-27 | Stop reason: SDUPTHER

## 2022-01-27 ENCOUNTER — E-ADVICE (OUTPATIENT)
Dept: FAMILY MEDICINE | Age: 62
End: 2022-01-27

## 2022-01-27 ENCOUNTER — TELEPHONE (OUTPATIENT)
Dept: SCHEDULING | Age: 62
End: 2022-01-27

## 2022-01-27 DIAGNOSIS — I10 ESSENTIAL HYPERTENSION: ICD-10-CM

## 2022-01-27 RX ORDER — ATENOLOL 50 MG/1
50 TABLET ORAL DAILY
Qty: 90 TABLET | Refills: 2 | Status: SHIPPED | OUTPATIENT
Start: 2022-01-27 | End: 2022-10-11

## 2022-01-27 RX ORDER — LISINOPRIL AND HYDROCHLOROTHIAZIDE 20; 12.5 MG/1; MG/1
1 TABLET ORAL DAILY
Qty: 90 TABLET | Refills: 2 | Status: SHIPPED | OUTPATIENT
Start: 2022-01-27 | End: 2022-10-11

## 2022-02-04 ENCOUNTER — EXTERNAL RECORD (OUTPATIENT)
Dept: HEALTH INFORMATION MANAGEMENT | Facility: OTHER | Age: 62
End: 2022-02-04

## 2022-02-22 DIAGNOSIS — Z11.59 SCREENING EXAMINATION FOR OTHER ARTHROPOD-BORNE VIRAL DISEASES: Primary | ICD-10-CM

## 2022-02-28 ENCOUNTER — LAB SERVICES (OUTPATIENT)
Dept: LAB | Age: 62
End: 2022-02-28

## 2022-02-28 LAB
SARS-COV-2 RNA RESP QL NAA+PROBE: NOT DETECTED
SERVICE CMNT-IMP: NORMAL
SERVICE CMNT-IMP: NORMAL

## 2022-02-28 PROCEDURE — U0003 INFECTIOUS AGENT DETECTION BY NUCLEIC ACID (DNA OR RNA); SEVERE ACUTE RESPIRATORY SYNDROME CORONAVIRUS 2 (SARS-COV-2) (CORONAVIRUS DISEASE [COVID-19]), AMPLIFIED PROBE TECHNIQUE, MAKING USE OF HIGH THROUGHPUT TECHNOLOGIES AS DESCRIBED BY CMS-2020-01-R: HCPCS | Performed by: PSYCHIATRY & NEUROLOGY

## 2022-02-28 PROCEDURE — U0005 INFEC AGEN DETEC AMPLI PROBE: HCPCS | Performed by: PSYCHIATRY & NEUROLOGY

## 2022-03-02 LAB — COLONOSCOPY STUDY: NORMAL

## 2022-08-05 ENCOUNTER — EXTERNAL RECORD (OUTPATIENT)
Dept: HEALTH INFORMATION MANAGEMENT | Facility: OTHER | Age: 62
End: 2022-08-05

## 2022-08-26 LAB
ALBUMIN SERPL-MCNC: 4.2 G/DL (ref 3.6–5.1)
ALBUMIN/GLOB SERPL: 1.3 (CALC) (ref 1–2.5)
ALP SERPL-CCNC: 85 U/L (ref 37–153)
ALT SERPL-CCNC: 26 U/L (ref 6–29)
AST SERPL-CCNC: 25 U/L (ref 10–35)
BILIRUB SERPL-MCNC: 0.4 MG/DL (ref 0.2–1.2)
BUN SERPL-MCNC: 19 MG/DL (ref 7–25)
BUN/CREAT SERPL: ABNORMAL (CALC) (ref 6–22)
CALCIUM SERPL-MCNC: 10.4 MG/DL (ref 8.6–10.4)
CHLORIDE SERPL-SCNC: 101 MMOL/L (ref 98–110)
CO2 SERPL-SCNC: 33 MMOL/L (ref 20–32)
CREAT SERPL-MCNC: 0.85 MG/DL (ref 0.5–1.05)
GFR SERPLBLD SCHWARTZ-ARVRAT: 78 ML/MIN/1.73M2
GLOBULIN SER-MCNC: 3.2 G/DL (CALC) (ref 1.9–3.7)
GLUCOSE SERPL-MCNC: 93 MG/DL (ref 65–99)
LENGTH OF FAST TIME PATIENT: YES H
POTASSIUM SERPL-SCNC: 4.2 MMOL/L (ref 3.5–5.3)
PROT SERPL-MCNC: 7.4 G/DL (ref 6.1–8.1)
SODIUM SERPL-SCNC: 138 MMOL/L (ref 135–146)

## 2022-10-11 DIAGNOSIS — I10 ESSENTIAL HYPERTENSION: ICD-10-CM

## 2022-10-11 RX ORDER — ATENOLOL 50 MG/1
TABLET ORAL
Qty: 90 TABLET | Refills: 0 | Status: SHIPPED | OUTPATIENT
Start: 2022-10-11 | End: 2022-11-04 | Stop reason: SDUPTHER

## 2022-10-11 RX ORDER — LISINOPRIL AND HYDROCHLOROTHIAZIDE 20; 12.5 MG/1; MG/1
TABLET ORAL
Qty: 90 TABLET | Refills: 0 | Status: SHIPPED | OUTPATIENT
Start: 2022-10-11 | End: 2022-11-04 | Stop reason: SDUPTHER

## 2022-11-04 ENCOUNTER — OFFICE VISIT (OUTPATIENT)
Dept: FAMILY MEDICINE | Age: 62
End: 2022-11-04

## 2022-11-04 VITALS
HEART RATE: 60 BPM | WEIGHT: 150 LBS | DIASTOLIC BLOOD PRESSURE: 72 MMHG | RESPIRATION RATE: 12 BRPM | SYSTOLIC BLOOD PRESSURE: 124 MMHG | BODY MASS INDEX: 25.61 KG/M2 | TEMPERATURE: 97.2 F | HEIGHT: 64 IN

## 2022-11-04 DIAGNOSIS — I10 ESSENTIAL HYPERTENSION: Primary | ICD-10-CM

## 2022-11-04 DIAGNOSIS — Z01.419 ENCOUNTER FOR GYNECOLOGICAL EXAMINATION WITHOUT ABNORMAL FINDING: ICD-10-CM

## 2022-11-04 DIAGNOSIS — Z12.31 ENCOUNTER FOR SCREENING MAMMOGRAM FOR MALIGNANT NEOPLASM OF BREAST: ICD-10-CM

## 2022-11-04 PROCEDURE — 88175 CYTOPATH C/V AUTO FLUID REDO: CPT | Performed by: INTERNAL MEDICINE

## 2022-11-04 PROCEDURE — 87624 HPV HI-RISK TYP POOLED RSLT: CPT | Performed by: INTERNAL MEDICINE

## 2022-11-04 PROCEDURE — 3074F SYST BP LT 130 MM HG: CPT | Performed by: FAMILY MEDICINE

## 2022-11-04 PROCEDURE — 3078F DIAST BP <80 MM HG: CPT | Performed by: FAMILY MEDICINE

## 2022-11-04 PROCEDURE — 99396 PREV VISIT EST AGE 40-64: CPT | Performed by: FAMILY MEDICINE

## 2022-11-04 RX ORDER — ATENOLOL 50 MG/1
50 TABLET ORAL DAILY
Qty: 90 TABLET | Refills: 3 | Status: SHIPPED | OUTPATIENT
Start: 2022-11-04

## 2022-11-04 RX ORDER — LISINOPRIL AND HYDROCHLOROTHIAZIDE 20; 12.5 MG/1; MG/1
1 TABLET ORAL DAILY
Qty: 90 TABLET | Refills: 3 | Status: SHIPPED | OUTPATIENT
Start: 2022-11-04

## 2022-11-04 ASSESSMENT — PATIENT HEALTH QUESTIONNAIRE - PHQ9
CLINICAL INTERPRETATION OF PHQ2 SCORE: NO FURTHER SCREENING NEEDED
1. LITTLE INTEREST OR PLEASURE IN DOING THINGS: NOT AT ALL
SUM OF ALL RESPONSES TO PHQ9 QUESTIONS 1 AND 2: 0
2. FEELING DOWN, DEPRESSED OR HOPELESS: NOT AT ALL
SUM OF ALL RESPONSES TO PHQ9 QUESTIONS 1 AND 2: 0

## 2022-11-10 LAB
CASE RPRT: NORMAL
CLINICAL INFO: NORMAL
CYTOLOGY CVX/VAG STUDY: NORMAL
CYTOLOGY CVX/VAG STUDY: NORMAL
HPV16+18+45 E6+E7MRNA CVX NAA+PROBE: NEGATIVE
Lab: NORMAL
PAP EDUCATIONAL NOTE: NORMAL
SPECIMEN ADEQUACY: NORMAL

## 2022-11-18 ENCOUNTER — HOSPITAL ENCOUNTER (OUTPATIENT)
Dept: MAMMOGRAPHY | Age: 62
Discharge: HOME | End: 2022-11-18
Attending: FAMILY MEDICINE

## 2022-11-18 DIAGNOSIS — Z12.31 ENCOUNTER FOR SCREENING MAMMOGRAM FOR MALIGNANT NEOPLASM OF BREAST: ICD-10-CM

## 2022-11-18 PROCEDURE — 77063 BREAST TOMOSYNTHESIS BI: CPT

## 2022-12-27 ENCOUNTER — EXTERNAL LAB (OUTPATIENT)
Dept: FAMILY MEDICINE | Age: 62
End: 2022-12-27

## 2022-12-27 LAB
ALBUMIN SERPL-MCNC: 4.2 G/DL (ref 3.6–5.1)
ALBUMIN/GLOB SERPL: 1.5 (CALC) (ref 1–2.5)
ALP SERPL-CCNC: 84 U/L (ref 37–153)
ALT SERPL-CCNC: 29 U/L (ref 6–29)
AST SERPL-CCNC: 29 U/L (ref 10–35)
BILIRUB SERPL-MCNC: 0.5 MG/DL (ref 0.2–1.2)
BUN SERPL-MCNC: 18 MG/DL (ref 7–25)
BUN/CREAT SERPL: ABNORMAL (CALC) (ref 6–22)
CALCIUM SERPL-MCNC: 10 MG/DL (ref 8.6–10.4)
CHLORIDE SERPL-SCNC: 99 MMOL/L (ref 98–110)
CHOLEST SERPL-MCNC: 194 MG/DL
CHOLEST/HDLC SERPL: 4.3 (CALC)
CO2 SERPL-SCNC: 33 MMOL/L (ref 20–32)
CREAT SERPL-MCNC: 0.86 MG/DL (ref 0.5–1.05)
GFR SERPLBLD SCHWARTZ-ARVRAT: 77 ML/MIN/1.73M2
GLOBULIN SER-MCNC: 2.8 G/DL (CALC) (ref 1.9–3.7)
GLUCOSE SERPL-MCNC: 83 MG/DL (ref 65–99)
HDLC SERPL-MCNC: 45 MG/DL
LDLC SERPL CALC-MCNC: 132 MG/DL (CALC)
LENGTH OF FAST TIME PATIENT: YES H
LENGTH OF FAST TIME PATIENT: YES H
NONHDLC SERPL-MCNC: 149 MG/DL (CALC)
POTASSIUM SERPL-SCNC: 3.9 MMOL/L (ref 3.5–5.3)
PROT SERPL-MCNC: 7 G/DL (ref 6.1–8.1)
SODIUM SERPL-SCNC: 137 MMOL/L (ref 135–146)
TRIGL SERPL-MCNC: 77 MG/DL

## 2023-01-06 ENCOUNTER — TELEPHONE (OUTPATIENT)
Dept: FAMILY MEDICINE | Age: 63
End: 2023-01-06

## 2023-01-06 DIAGNOSIS — E78.00 ELEVATED LDL CHOLESTEROL LEVEL: ICD-10-CM

## 2023-01-06 DIAGNOSIS — I10 ESSENTIAL HYPERTENSION: Primary | ICD-10-CM

## 2023-01-06 DIAGNOSIS — R94.4 DECREASED GFR: ICD-10-CM

## 2023-01-11 ENCOUNTER — E-ADVICE (OUTPATIENT)
Dept: FAMILY MEDICINE | Age: 63
End: 2023-01-11

## 2023-01-11 DIAGNOSIS — E78.00 ELEVATED CHOLESTEROL: Primary | ICD-10-CM

## 2023-02-21 ENCOUNTER — OFFICE VISIT (OUTPATIENT)
Dept: INTERNAL MEDICINE CLINIC | Facility: CLINIC | Age: 63
End: 2023-02-21
Payer: COMMERCIAL

## 2023-02-21 VITALS
WEIGHT: 148.13 LBS | OXYGEN SATURATION: 98 % | HEIGHT: 63 IN | TEMPERATURE: 98 F | DIASTOLIC BLOOD PRESSURE: 60 MMHG | RESPIRATION RATE: 14 BRPM | SYSTOLIC BLOOD PRESSURE: 116 MMHG | BODY MASS INDEX: 26.25 KG/M2 | HEART RATE: 56 BPM

## 2023-02-21 DIAGNOSIS — M85.89 OSTEOPENIA OF MULTIPLE SITES: ICD-10-CM

## 2023-02-21 DIAGNOSIS — F41.9 ANXIETY: ICD-10-CM

## 2023-02-21 DIAGNOSIS — H61.21 IMPACTED CERUMEN OF RIGHT EAR: ICD-10-CM

## 2023-02-21 DIAGNOSIS — R29.898 NECK TIGHTNESS: Primary | ICD-10-CM

## 2023-02-21 DIAGNOSIS — G43.709 CHRONIC MIGRAINE WITHOUT AURA WITHOUT STATUS MIGRAINOSUS, NOT INTRACTABLE: ICD-10-CM

## 2023-02-21 DIAGNOSIS — I10 ESSENTIAL HYPERTENSION: ICD-10-CM

## 2023-02-21 PROCEDURE — 3008F BODY MASS INDEX DOCD: CPT | Performed by: NURSE PRACTITIONER

## 2023-02-21 PROCEDURE — 99204 OFFICE O/P NEW MOD 45 MIN: CPT | Performed by: NURSE PRACTITIONER

## 2023-02-21 PROCEDURE — 3074F SYST BP LT 130 MM HG: CPT | Performed by: NURSE PRACTITIONER

## 2023-02-21 PROCEDURE — 3078F DIAST BP <80 MM HG: CPT | Performed by: NURSE PRACTITIONER

## 2023-02-21 NOTE — PATIENT INSTRUCTIONS
Get neck ultrasound done. Start daily multivitamin. Start over the counter Debrox ear drops. Apply 3 drops to right ear daily x 1 week. Let water run into ear during shower and clean with wascloth. Do not use Q-tips. Come back for ear irrigation in 10-14 days as needed.

## 2023-02-22 ENCOUNTER — TELEPHONE (OUTPATIENT)
Dept: INTERNAL MEDICINE CLINIC | Facility: CLINIC | Age: 63
End: 2023-02-22

## 2023-02-22 PROBLEM — M85.89 OSTEOPENIA OF MULTIPLE SITES: Status: ACTIVE | Noted: 2023-02-22

## 2023-02-22 PROBLEM — G43.709 CHRONIC MIGRAINE WITHOUT AURA WITHOUT STATUS MIGRAINOSUS, NOT INTRACTABLE: Status: ACTIVE | Noted: 2023-02-22

## 2023-02-22 NOTE — TELEPHONE ENCOUNTER
Incoming (mail or fax):  fax  Received from:  [facility]  Documentation given to:  Sahil Weems

## 2023-02-24 LAB
ABSOLUTE BASOPHILS: 50 CELLS/UL (ref 0–200)
ABSOLUTE EOSINOPHILS: 90 CELLS/UL (ref 15–500)
ABSOLUTE LYMPHOCYTES: 1434 CELLS/UL (ref 850–3900)
ABSOLUTE MONOCYTES: 398 CELLS/UL (ref 200–950)
ABSOLUTE NEUTROPHILS: 3629 CELLS/UL (ref 1500–7800)
BASOPHILS: 0.9 %
EOSINOPHILS: 1.6 %
HEMATOCRIT: 38.5 % (ref 35–45)
HEMOGLOBIN: 12.9 G/DL (ref 11.7–15.5)
LYMPHOCYTES: 25.6 %
MCH: 30.5 PG (ref 27–33)
MCHC: 33.5 G/DL (ref 32–36)
MCV: 91 FL (ref 80–100)
MONOCYTES: 7.1 %
MPV: 12 FL (ref 7.5–12.5)
NEUTROPHILS: 64.8 %
PLATELET COUNT: 233 THOUSAND/UL (ref 140–400)
RDW: 11.7 % (ref 11–15)
RED BLOOD CELL COUNT: 4.23 MILLION/UL (ref 3.8–5.1)
TSH W/REFLEX TO FT4: 1.4 MIU/L (ref 0.4–4.5)
WHITE BLOOD CELL COUNT: 5.6 THOUSAND/UL (ref 3.8–10.8)

## 2023-03-02 ENCOUNTER — HOSPITAL ENCOUNTER (OUTPATIENT)
Dept: ULTRASOUND IMAGING | Age: 63
Discharge: HOME | End: 2023-03-02
Attending: NURSE PRACTITIONER
Payer: COMMERCIAL

## 2023-03-02 DIAGNOSIS — M79.89 NODULE OF SOFT TISSUE: ICD-10-CM

## 2023-03-02 DIAGNOSIS — E04.1 THYROID NODULE: Primary | ICD-10-CM

## 2023-03-02 DIAGNOSIS — R29.898 NECK TIGHTNESS: ICD-10-CM

## 2023-03-02 PROCEDURE — 76536 US EXAM OF HEAD AND NECK: CPT | Performed by: NURSE PRACTITIONER

## 2023-03-08 ENCOUNTER — HOSPITAL ENCOUNTER (OUTPATIENT)
Dept: CT IMAGING | Age: 63
Discharge: HOME | End: 2023-03-08
Attending: NURSE PRACTITIONER
Payer: COMMERCIAL

## 2023-03-08 DIAGNOSIS — M79.89 NODULE OF SOFT TISSUE: ICD-10-CM

## 2023-03-08 PROCEDURE — 82565 ASSAY OF CREATININE: CPT

## 2023-03-08 PROCEDURE — 70491 CT SOFT TISSUE NECK W/DYE: CPT | Performed by: NURSE PRACTITIONER

## 2023-03-12 LAB
CREAT BLD-MCNC: 0.9 MG/DL
GFR SERPLBLD BASED ON 1.73 SQ M-ARVRAT: 73 ML/MIN/1.73M2 (ref 60–?)

## 2023-03-13 ENCOUNTER — MED REC SCAN ONLY (OUTPATIENT)
Dept: INTERNAL MEDICINE CLINIC | Facility: CLINIC | Age: 63
End: 2023-03-13

## 2023-03-15 ENCOUNTER — TELEPHONE (OUTPATIENT)
Dept: INTERNAL MEDICINE CLINIC | Facility: CLINIC | Age: 63
End: 2023-03-15

## 2023-03-15 NOTE — TELEPHONE ENCOUNTER
Incoming (mail or fax):  fax  Received from:  [facility]  Documentation given to:  Triage results bin    Colonoscopy results

## 2023-03-15 NOTE — TELEPHONE ENCOUNTER
Incoming (mail or fax):  fax  Received from:  [facility]  Documentation given to:  Triage results bin    Lab results

## 2023-03-16 NOTE — TELEPHONE ENCOUNTER
Received pathology report from 3/2/22 ordered during colonoscopy by [facility], biopsy of transverse colon polyp shows colonic mucosa with mild reactive or hyperplastic change. In Dorothy's bin for review.

## 2023-03-16 NOTE — TELEPHONE ENCOUNTER
Received records from [facility] including colonoscopy from 3/2/22-  already updated. In Dorothy's bin for review.

## 2023-03-31 ENCOUNTER — OFFICE VISIT (OUTPATIENT)
Facility: LOCATION | Age: 63
End: 2023-03-31
Payer: COMMERCIAL

## 2023-03-31 DIAGNOSIS — E04.2 MULTIPLE THYROID NODULES: Primary | ICD-10-CM

## 2023-03-31 PROCEDURE — 99204 OFFICE O/P NEW MOD 45 MIN: CPT | Performed by: OTOLARYNGOLOGY

## 2023-04-11 ENCOUNTER — LAB ENCOUNTER (OUTPATIENT)
Dept: LAB | Age: 63
End: 2023-04-11
Attending: OTOLARYNGOLOGY
Payer: COMMERCIAL

## 2023-04-11 DIAGNOSIS — E04.2 MULTIPLE THYROID NODULES: ICD-10-CM

## 2023-04-12 LAB — SARS-COV-2 RNA RESP QL NAA+PROBE: NOT DETECTED

## 2023-04-14 ENCOUNTER — HOSPITAL ENCOUNTER (OUTPATIENT)
Dept: ULTRASOUND IMAGING | Facility: HOSPITAL | Age: 63
Discharge: HOME | End: 2023-04-14
Attending: OTOLARYNGOLOGY
Payer: COMMERCIAL

## 2023-04-14 DIAGNOSIS — E04.2 MULTIPLE THYROID NODULES: ICD-10-CM

## 2023-04-14 PROCEDURE — 88173 CYTOPATH EVAL FNA REPORT: CPT | Performed by: OTOLARYNGOLOGY

## 2023-04-14 PROCEDURE — 10005 FNA BX W/US GDN 1ST LES: CPT | Performed by: OTOLARYNGOLOGY

## 2023-04-14 NOTE — PROCEDURES
PROCEDURE: US FNA THYROID, GUIDED INCLD, FIRST LESION (CPT=10005)    LOCATION: [facility]    COMPARISON: None. INDICATIONS: E04.2 Multiple thyroid nodules    DESCRIPTION: The risks, benefits, and alternatives of the procedure were explained to the patient and witnessed informed written and verbal consent was documented in the chart. Time out was performed by the staff. Potential complications including bleeding, infection, and the possibility of necessity for repeat biopsy due to under sampling were discussed with the patient and they related understanding. After obtaining informed consent, an ultrasound-guided FNA was performed in the usual sterile manner. The neck was prepped and draped in sterile fashion and 1% lidocaine was used for local anesthetic. BIOPSY NEEDLE: 25 gauge FNA  SPECIMEN TYPE, #, LOCATION: Five passes performed in the 1.3 cm left thyroid nodule. MEDICATION: 1% lidocaine for local anesthetic  COMPLICATIONS: None. PATHOLOGY LAB: Pathology present for assessment of adequacy of sampling. CONCLUSION: Uneventful ultrasound guided FNA. The patient was instructed to obtain follow up care and FNA results from the referring physician.

## 2023-04-19 ENCOUNTER — TELEPHONE (OUTPATIENT)
Facility: LOCATION | Age: 63
End: 2023-04-19

## 2023-04-19 NOTE — TELEPHONE ENCOUNTER
Patient given results over the phone fine-needle aspiration left thyroid nodule was consistent with follicular neoplasm [place] sending it off to have molecular testing okayed by patient pending insurance pre-CERT

## 2023-05-01 ENCOUNTER — OFFICE VISIT (OUTPATIENT)
Dept: FAMILY MEDICINE CLINIC | Facility: CLINIC | Age: 63
End: 2023-05-01
Payer: COMMERCIAL

## 2023-05-01 VITALS
BODY MASS INDEX: 24.75 KG/M2 | OXYGEN SATURATION: 97 % | HEART RATE: 57 BPM | SYSTOLIC BLOOD PRESSURE: 119 MMHG | RESPIRATION RATE: 16 BRPM | DIASTOLIC BLOOD PRESSURE: 64 MMHG | HEIGHT: 64 IN | TEMPERATURE: 99 F | WEIGHT: 145 LBS

## 2023-05-01 DIAGNOSIS — U07.1 COVID-19: ICD-10-CM

## 2023-05-01 DIAGNOSIS — J02.9 SORE THROAT: Primary | ICD-10-CM

## 2023-05-01 LAB
CONTROL LINE PRESENT WITH A CLEAR BACKGROUND (YES/NO): YES YES/NO
KIT LOT #: NORMAL NUMERIC
STREP GRP A CUL-SCR: NEGATIVE

## 2023-05-01 PROCEDURE — 99213 OFFICE O/P EST LOW 20 MIN: CPT | Performed by: PHYSICIAN ASSISTANT

## 2023-05-01 PROCEDURE — 3008F BODY MASS INDEX DOCD: CPT | Performed by: PHYSICIAN ASSISTANT

## 2023-05-01 PROCEDURE — 87081 CULTURE SCREEN ONLY: CPT | Performed by: PHYSICIAN ASSISTANT

## 2023-05-01 PROCEDURE — 3078F DIAST BP <80 MM HG: CPT | Performed by: PHYSICIAN ASSISTANT

## 2023-05-01 PROCEDURE — 3074F SYST BP LT 130 MM HG: CPT | Performed by: PHYSICIAN ASSISTANT

## 2023-05-01 PROCEDURE — 87880 STREP A ASSAY W/OPTIC: CPT | Performed by: PHYSICIAN ASSISTANT

## 2023-05-02 ENCOUNTER — TELEPHONE (OUTPATIENT)
Facility: LOCATION | Age: 63
End: 2023-05-02

## 2023-05-10 ENCOUNTER — TELEPHONE (OUTPATIENT)
Facility: LOCATION | Age: 63
End: 2023-05-10

## 2023-05-10 NOTE — TELEPHONE ENCOUNTER
Patient was given results over the phone of the affirm a molecular testing of the left thyroid nodule this was 75% positive for malignancy. It was recommended a total thyroidectomy. I explained risks and complications to include recurrent laryngeal nerve injury and parathyroid injury. She will be calling for surgical date.

## 2023-05-11 ENCOUNTER — TELEPHONE (OUTPATIENT)
Facility: LOCATION | Age: 63
End: 2023-05-11

## 2023-05-11 DIAGNOSIS — I10 HYPERTENSION, UNSPECIFIED TYPE: Primary | ICD-10-CM

## 2023-05-11 DIAGNOSIS — D49.7 FOLLICULAR NEOPLASM OF THYROID: ICD-10-CM

## 2023-05-20 ENCOUNTER — LAB ENCOUNTER (OUTPATIENT)
Dept: LAB | Age: 63
End: 2023-05-20
Attending: OTOLARYNGOLOGY
Payer: COMMERCIAL

## 2023-05-20 ENCOUNTER — EKG ENCOUNTER (OUTPATIENT)
Dept: LAB | Age: 63
End: 2023-05-20
Attending: OTOLARYNGOLOGY
Payer: COMMERCIAL

## 2023-05-20 DIAGNOSIS — I10 HYPERTENSION, UNSPECIFIED TYPE: ICD-10-CM

## 2023-05-20 LAB
ALBUMIN SERPL-MCNC: 3.5 G/DL (ref 3.4–5)
ALBUMIN/GLOB SERPL: 0.9 {RATIO} (ref 1–2)
ALP LIVER SERPL-CCNC: 78 U/L
ALT SERPL-CCNC: 27 U/L
ANION GAP SERPL CALC-SCNC: 5 MMOL/L (ref 0–18)
AST SERPL-CCNC: 21 U/L (ref 15–37)
BILIRUB SERPL-MCNC: 0.4 MG/DL (ref 0.1–2)
BUN BLD-MCNC: 22 MG/DL (ref 7–18)
CALCIUM BLD-MCNC: 10.1 MG/DL (ref 8.5–10.1)
CHLORIDE SERPL-SCNC: 105 MMOL/L (ref 98–112)
CO2 SERPL-SCNC: 29 MMOL/L (ref 21–32)
CREAT BLD-MCNC: 0.8 MG/DL
FASTING STATUS PATIENT QL REPORTED: YES
GFR SERPLBLD BASED ON 1.73 SQ M-ARVRAT: 84 ML/MIN/1.73M2 (ref 60–?)
GLOBULIN PLAS-MCNC: 3.9 G/DL (ref 2.8–4.4)
GLUCOSE BLD-MCNC: 85 MG/DL (ref 70–99)
OSMOLALITY SERPL CALC.SUM OF ELEC: 291 MOSM/KG (ref 275–295)
POTASSIUM SERPL-SCNC: 3.7 MMOL/L (ref 3.5–5.1)
PROT SERPL-MCNC: 7.4 G/DL (ref 6.4–8.2)
SODIUM SERPL-SCNC: 139 MMOL/L (ref 136–145)

## 2023-05-20 PROCEDURE — 80053 COMPREHEN METABOLIC PANEL: CPT | Performed by: OTOLARYNGOLOGY

## 2023-05-20 PROCEDURE — 36415 COLL VENOUS BLD VENIPUNCTURE: CPT | Performed by: OTOLARYNGOLOGY

## 2023-05-20 PROCEDURE — 93010 ELECTROCARDIOGRAM REPORT: CPT | Performed by: INTERNAL MEDICINE

## 2023-05-20 PROCEDURE — 93005 ELECTROCARDIOGRAM TRACING: CPT

## 2023-05-21 LAB
ATRIAL RATE: 53 BPM
P AXIS: -18 DEGREES
P-R INTERVAL: 136 MS
Q-T INTERVAL: 410 MS
QRS DURATION: 84 MS
QTC CALCULATION (BEZET): 384 MS
R AXIS: 59 DEGREES
T AXIS: 51 DEGREES
VENTRICULAR RATE: 53 BPM

## 2023-06-26 RX ORDER — GARLIC EXTRACT 500 MG
1 CAPSULE ORAL DAILY
COMMUNITY

## 2023-07-06 ENCOUNTER — HOSPITAL ENCOUNTER (OUTPATIENT)
Facility: HOSPITAL | Age: 63
Discharge: HOME | End: 2023-07-07
Attending: OTOLARYNGOLOGY | Admitting: OTOLARYNGOLOGY
Payer: COMMERCIAL

## 2023-07-06 ENCOUNTER — ANESTHESIA (OUTPATIENT)
Dept: SURGERY | Facility: HOSPITAL | Age: 63
End: 2023-07-06
Payer: COMMERCIAL

## 2023-07-06 ENCOUNTER — ANESTHESIA EVENT (OUTPATIENT)
Dept: SURGERY | Facility: HOSPITAL | Age: 63
End: 2023-07-06
Payer: COMMERCIAL

## 2023-07-06 DIAGNOSIS — D49.7 FOLLICULAR NEOPLASM OF THYROID: ICD-10-CM

## 2023-07-06 PROCEDURE — 60240 REMOVAL OF THYROID: CPT | Performed by: OTOLARYNGOLOGY

## 2023-07-06 PROCEDURE — 0GTH0ZZ RESECTION OF RIGHT THYROID GLAND LOBE, OPEN APPROACH: ICD-10-PCS | Performed by: OTOLARYNGOLOGY

## 2023-07-06 PROCEDURE — 0GTG0ZZ RESECTION OF LEFT THYROID GLAND LOBE, OPEN APPROACH: ICD-10-PCS | Performed by: OTOLARYNGOLOGY

## 2023-07-06 RX ORDER — HYDROMORPHONE HYDROCHLORIDE 1 MG/ML
0.2 INJECTION, SOLUTION INTRAMUSCULAR; INTRAVENOUS; SUBCUTANEOUS EVERY 5 MIN PRN
Status: DISCONTINUED | OUTPATIENT
Start: 2023-07-06 | End: 2023-07-06 | Stop reason: HOSPADM

## 2023-07-06 RX ORDER — NALOXONE HYDROCHLORIDE 0.4 MG/ML
80 INJECTION, SOLUTION INTRAMUSCULAR; INTRAVENOUS; SUBCUTANEOUS AS NEEDED
Status: DISCONTINUED | OUTPATIENT
Start: 2023-07-06 | End: 2023-07-06 | Stop reason: HOSPADM

## 2023-07-06 RX ORDER — HYDROMORPHONE HYDROCHLORIDE 1 MG/ML
0.8 INJECTION, SOLUTION INTRAMUSCULAR; INTRAVENOUS; SUBCUTANEOUS EVERY 2 HOUR PRN
Status: DISCONTINUED | OUTPATIENT
Start: 2023-07-06 | End: 2023-07-07

## 2023-07-06 RX ORDER — LIDOCAINE HYDROCHLORIDE AND EPINEPHRINE 10; 10 MG/ML; UG/ML
INJECTION, SOLUTION INFILTRATION; PERINEURAL AS NEEDED
Status: DISCONTINUED | OUTPATIENT
Start: 2023-07-06 | End: 2023-07-06 | Stop reason: HOSPADM

## 2023-07-06 RX ORDER — DEXAMETHASONE SODIUM PHOSPHATE 4 MG/ML
VIAL (ML) INJECTION AS NEEDED
Status: DISCONTINUED | OUTPATIENT
Start: 2023-07-06 | End: 2023-07-06 | Stop reason: SURG

## 2023-07-06 RX ORDER — ONDANSETRON 2 MG/ML
4 INJECTION INTRAMUSCULAR; INTRAVENOUS EVERY 6 HOURS PRN
Status: DISCONTINUED | OUTPATIENT
Start: 2023-07-06 | End: 2023-07-06 | Stop reason: HOSPADM

## 2023-07-06 RX ORDER — HYDROCODONE BITARTRATE AND ACETAMINOPHEN 5; 325 MG/1; MG/1
1 TABLET ORAL ONCE AS NEEDED
Status: DISCONTINUED | OUTPATIENT
Start: 2023-07-06 | End: 2023-07-06 | Stop reason: HOSPADM

## 2023-07-06 RX ORDER — CALCIUM CARBONATE 500(1250)
1000 TABLET ORAL ONCE
Status: DISCONTINUED | OUTPATIENT
Start: 2023-07-06 | End: 2023-07-06 | Stop reason: HOSPADM

## 2023-07-06 RX ORDER — SODIUM CHLORIDE, SODIUM LACTATE, POTASSIUM CHLORIDE, CALCIUM CHLORIDE 600; 310; 30; 20 MG/100ML; MG/100ML; MG/100ML; MG/100ML
INJECTION, SOLUTION INTRAVENOUS CONTINUOUS
Status: DISCONTINUED | OUTPATIENT
Start: 2023-07-06 | End: 2023-07-07

## 2023-07-06 RX ORDER — CALCIUM CARBONATE 500(1250)
1000 TABLET ORAL
Status: DISCONTINUED | OUTPATIENT
Start: 2023-07-06 | End: 2023-07-07

## 2023-07-06 RX ORDER — HYDROMORPHONE HYDROCHLORIDE 1 MG/ML
0.4 INJECTION, SOLUTION INTRAMUSCULAR; INTRAVENOUS; SUBCUTANEOUS EVERY 2 HOUR PRN
Status: DISCONTINUED | OUTPATIENT
Start: 2023-07-06 | End: 2023-07-07

## 2023-07-06 RX ORDER — PROCHLORPERAZINE EDISYLATE 5 MG/ML
5 INJECTION INTRAMUSCULAR; INTRAVENOUS EVERY 8 HOURS PRN
Status: DISCONTINUED | OUTPATIENT
Start: 2023-07-06 | End: 2023-07-06 | Stop reason: HOSPADM

## 2023-07-06 RX ORDER — PROCHLORPERAZINE EDISYLATE 5 MG/ML
INJECTION INTRAMUSCULAR; INTRAVENOUS
Status: COMPLETED
Start: 2023-07-06 | End: 2023-07-06

## 2023-07-06 RX ORDER — DIPHENHYDRAMINE HYDROCHLORIDE 50 MG/ML
12.5 INJECTION INTRAMUSCULAR; INTRAVENOUS AS NEEDED
Status: DISCONTINUED | OUTPATIENT
Start: 2023-07-06 | End: 2023-07-06 | Stop reason: HOSPADM

## 2023-07-06 RX ORDER — OXYCODONE HYDROCHLORIDE 5 MG/1
5 TABLET ORAL EVERY 4 HOURS PRN
Status: DISCONTINUED | OUTPATIENT
Start: 2023-07-06 | End: 2023-07-07

## 2023-07-06 RX ORDER — HYDROMORPHONE HYDROCHLORIDE 1 MG/ML
0.6 INJECTION, SOLUTION INTRAMUSCULAR; INTRAVENOUS; SUBCUTANEOUS EVERY 5 MIN PRN
Status: DISCONTINUED | OUTPATIENT
Start: 2023-07-06 | End: 2023-07-06 | Stop reason: HOSPADM

## 2023-07-06 RX ORDER — DEXTROSE, SODIUM CHLORIDE, SODIUM LACTATE, POTASSIUM CHLORIDE, AND CALCIUM CHLORIDE 5; .6; .31; .03; .02 G/100ML; G/100ML; G/100ML; G/100ML; G/100ML
INJECTION, SOLUTION INTRAVENOUS CONTINUOUS
Status: DISCONTINUED | OUTPATIENT
Start: 2023-07-06 | End: 2023-07-07

## 2023-07-06 RX ORDER — LIDOCAINE HYDROCHLORIDE 10 MG/ML
INJECTION, SOLUTION EPIDURAL; INFILTRATION; INTRACAUDAL; PERINEURAL AS NEEDED
Status: DISCONTINUED | OUTPATIENT
Start: 2023-07-06 | End: 2023-07-06 | Stop reason: SURG

## 2023-07-06 RX ORDER — ZOLPIDEM TARTRATE 5 MG/1
5 TABLET ORAL NIGHTLY PRN
Status: DISCONTINUED | OUTPATIENT
Start: 2023-07-06 | End: 2023-07-07

## 2023-07-06 RX ORDER — ONDANSETRON 2 MG/ML
INJECTION INTRAMUSCULAR; INTRAVENOUS AS NEEDED
Status: DISCONTINUED | OUTPATIENT
Start: 2023-07-06 | End: 2023-07-06 | Stop reason: SURG

## 2023-07-06 RX ORDER — OXYCODONE HYDROCHLORIDE 10 MG/1
10 TABLET ORAL EVERY 4 HOURS PRN
Status: DISCONTINUED | OUTPATIENT
Start: 2023-07-06 | End: 2023-07-07

## 2023-07-06 RX ORDER — ONDANSETRON 2 MG/ML
INJECTION INTRAMUSCULAR; INTRAVENOUS
Status: COMPLETED
Start: 2023-07-06 | End: 2023-07-06

## 2023-07-06 RX ORDER — ACETAMINOPHEN 500 MG
1000 TABLET ORAL ONCE
Status: DISCONTINUED | OUTPATIENT
Start: 2023-07-06 | End: 2023-07-06 | Stop reason: HOSPADM

## 2023-07-06 RX ORDER — HYDROMORPHONE HYDROCHLORIDE 1 MG/ML
INJECTION, SOLUTION INTRAMUSCULAR; INTRAVENOUS; SUBCUTANEOUS
Status: COMPLETED
Start: 2023-07-06 | End: 2023-07-06

## 2023-07-06 RX ORDER — MIDAZOLAM HYDROCHLORIDE 1 MG/ML
1 INJECTION INTRAMUSCULAR; INTRAVENOUS EVERY 5 MIN PRN
Status: DISCONTINUED | OUTPATIENT
Start: 2023-07-06 | End: 2023-07-06 | Stop reason: HOSPADM

## 2023-07-06 RX ORDER — ACETAMINOPHEN 500 MG
1000 TABLET ORAL ONCE AS NEEDED
Status: DISCONTINUED | OUTPATIENT
Start: 2023-07-06 | End: 2023-07-06 | Stop reason: HOSPADM

## 2023-07-06 RX ORDER — MEPERIDINE HYDROCHLORIDE 25 MG/ML
12.5 INJECTION INTRAMUSCULAR; INTRAVENOUS; SUBCUTANEOUS AS NEEDED
Status: DISCONTINUED | OUTPATIENT
Start: 2023-07-06 | End: 2023-07-06 | Stop reason: HOSPADM

## 2023-07-06 RX ORDER — ONDANSETRON 2 MG/ML
4 INJECTION INTRAMUSCULAR; INTRAVENOUS EVERY 6 HOURS PRN
Status: DISCONTINUED | OUTPATIENT
Start: 2023-07-06 | End: 2023-07-07

## 2023-07-06 RX ORDER — HYDROMORPHONE HYDROCHLORIDE 1 MG/ML
0.4 INJECTION, SOLUTION INTRAMUSCULAR; INTRAVENOUS; SUBCUTANEOUS EVERY 5 MIN PRN
Status: DISCONTINUED | OUTPATIENT
Start: 2023-07-06 | End: 2023-07-06 | Stop reason: HOSPADM

## 2023-07-06 RX ORDER — CALCIUM CARBONATE 500(1250)
TABLET ORAL
Status: DISCONTINUED
Start: 2023-07-06 | End: 2023-07-07

## 2023-07-06 RX ORDER — CALCITRIOL 0.25 UG/1
0.25 CAPSULE, LIQUID FILLED ORAL DAILY
Status: DISCONTINUED | OUTPATIENT
Start: 2023-07-06 | End: 2023-07-07

## 2023-07-06 RX ORDER — LISINOPRIL AND HYDROCHLOROTHIAZIDE 20; 12.5 MG/1; MG/1
1 TABLET ORAL DAILY
Status: DISCONTINUED | OUTPATIENT
Start: 2023-07-06 | End: 2023-07-06

## 2023-07-06 RX ORDER — CALCITRIOL 0.25 UG/1
CAPSULE, LIQUID FILLED ORAL
Status: DISCONTINUED
Start: 2023-07-06 | End: 2023-07-07

## 2023-07-06 RX ORDER — ATENOLOL 50 MG/1
50 TABLET ORAL
Status: DISCONTINUED | OUTPATIENT
Start: 2023-07-07 | End: 2023-07-07

## 2023-07-06 RX ORDER — SCOLOPAMINE TRANSDERMAL SYSTEM 1 MG/1
1 PATCH, EXTENDED RELEASE TRANSDERMAL ONCE
Status: DISCONTINUED | OUTPATIENT
Start: 2023-07-06 | End: 2023-07-06 | Stop reason: HOSPADM

## 2023-07-06 RX ORDER — SODIUM CHLORIDE, SODIUM LACTATE, POTASSIUM CHLORIDE, CALCIUM CHLORIDE 600; 310; 30; 20 MG/100ML; MG/100ML; MG/100ML; MG/100ML
INJECTION, SOLUTION INTRAVENOUS CONTINUOUS
Status: DISCONTINUED | OUTPATIENT
Start: 2023-07-06 | End: 2023-07-06 | Stop reason: HOSPADM

## 2023-07-06 RX ORDER — CALCITRIOL 0.25 UG/1
0.25 CAPSULE, LIQUID FILLED ORAL ONCE
Status: DISCONTINUED | OUTPATIENT
Start: 2023-07-06 | End: 2023-07-06 | Stop reason: HOSPADM

## 2023-07-06 RX ORDER — HYDROCODONE BITARTRATE AND ACETAMINOPHEN 5; 325 MG/1; MG/1
2 TABLET ORAL ONCE AS NEEDED
Status: DISCONTINUED | OUTPATIENT
Start: 2023-07-06 | End: 2023-07-06 | Stop reason: HOSPADM

## 2023-07-06 RX ORDER — METOCLOPRAMIDE HYDROCHLORIDE 5 MG/ML
10 INJECTION INTRAMUSCULAR; INTRAVENOUS EVERY 6 HOURS PRN
Status: DISCONTINUED | OUTPATIENT
Start: 2023-07-06 | End: 2023-07-07

## 2023-07-06 RX ADMIN — SODIUM CHLORIDE, SODIUM LACTATE, POTASSIUM CHLORIDE, CALCIUM CHLORIDE: 600; 310; 30; 20 INJECTION, SOLUTION INTRAVENOUS at 09:27:00

## 2023-07-06 RX ADMIN — ONDANSETRON 4 MG: 2 INJECTION INTRAMUSCULAR; INTRAVENOUS at 09:32:00

## 2023-07-06 RX ADMIN — LIDOCAINE HYDROCHLORIDE 80 MG: 10 INJECTION, SOLUTION EPIDURAL; INFILTRATION; INTRACAUDAL; PERINEURAL at 09:27:00

## 2023-07-06 RX ADMIN — DEXAMETHASONE SODIUM PHOSPHATE 8 MG: 4 MG/ML VIAL (ML) INJECTION at 09:32:00

## 2023-07-06 RX ADMIN — SODIUM CHLORIDE, SODIUM LACTATE, POTASSIUM CHLORIDE, CALCIUM CHLORIDE: 600; 310; 30; 20 INJECTION, SOLUTION INTRAVENOUS at 10:32:00

## 2023-07-06 NOTE — ANESTHESIA PROCEDURE NOTES
Airway  Date/Time: 7/6/2023 9:28 AM  Urgency: elective      General Information and Staff    Patient location during procedure: OR  Anesthesiologist: Angelika White MD  Performed: anesthesiologist   Performed by: Angelika White MD  Authorized by: Angelika White MD      Indications and Patient Condition  Indications for airway management: anesthesia  Sedation level: deep  Preoxygenated: yes  Patient position: sniffing  Mask difficulty assessment: 1 - vent by mask    Final Airway Details  Final airway type: endotracheal airway      Successful airway: ETT  Cuffed: yes   Successful intubation technique: direct laryngoscopy  Endotracheal tube insertion site: oral  Blade: Wan  Blade size: #2  ETT size (mm): 6.0    Cormack-Lehane Classification: grade I - full view of glottis  Placement verified by: capnometry   Measured from: lips  ETT to lips (cm): 20  Number of attempts at approach: 1    Additional Comments  Neuro monitoringtube

## 2023-07-06 NOTE — BRIEF OP NOTE
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

## 2023-07-06 NOTE — OPERATIVE REPORT
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 She was prepped and draped infiltrated with 1% Xylocaine with 1 1000's epinephrine in the proposed incision site. A 6 cm incision was made in between the cricoid cartilage and the sternal notch carried down through skin subcutaneous tissue and platysma muscle. Subplatysmal flaps were then elevated superiorly and inferiorly and a self-retaining thyroid retractor was used. Dissection down the median raphae of the strap muscles to the thyroid isthmus was accomplished. The strap muscles were then reflected off of the left and the right lobes of the thyroid. The tumor mass was located in the left superior lobe. We started on the left side dissecting inferiorly to find the recurrent laryngeal nerve with the nerve monitoring system. After finding this we followed it superiorly reflecting the thyroid gland off the nerve. We found an preserved and inferior parathyroid with blood supply. Went to the superior pole ligated superior pole blood vessels preserved the superior pole parathyroid with blood supply. The remainder of the procedure was done under direct visualization of the recurrent laryngeal nerve reflecting the gland off the nerve and then off of the trachea.   We went to the right side it did a similar procedure. Inferior dissection finding the recurrent laryngeal nerve. Protection and preservation of inferior parathyroid with the blood supply. Went to superior pole ligated superior pole blood vessels. Save the superior pole parathyroid with blood supply. I dissected the gland on the right side under direct visualization recurrent laryngeal nerve. The right upper lobe of the thyroid was marked with a long silk suture. This was sent in formalin to the pathologist.  Cleven Kale was performed along with hemostasis Josef was placed into the wound and then a layered closure was performed closing the skin with 4-0 Prolene in a subcuticular fashion. The wound was dressed with Steri-Strips and a sterile Tegaderm dressing.   Estimated blood loss is 20 cc no complications    Obed Soler MD  7/6/2023  11:14 AM

## 2023-07-06 NOTE — INTERVAL H&P NOTE
The above referenced H&P was reviewed by Lalito Rushing MD on 7/6/2023, the patient was examined and no significant changes have occurred in the patient's condition since the H&P was performed. Risks and benefits were discussed, proceed with procedure as planned. 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 I discussed with the patient and/or legal representative the potential benefits, risks and side effects of this procedure; the likelihood of the patient achieving goals; and potential problems that might occur during recuperation. I discussed reasonable alternatives to the procedure, including risks, benefits and side effects related to the alternatives and risks related to not receiving this procedure. We will proceed with procedure as planned.

## 2023-07-06 NOTE — PROGRESS NOTES
Pt is alert and oriented x4. On room air. Denies shortness of breath. VSS. Incision to neck with guaze and tegaderm c/d/I. Abdomen soft and nontender. Bowel sounds active. Reports mild pain. Not passing gas. Reports belching. SCDs in place. Pt dtv. IVF infusing. Starting on regular diet. Updated on plan of care. Call light within reach.

## 2023-07-07 ENCOUNTER — TELEPHONE (OUTPATIENT)
Facility: LOCATION | Age: 63
End: 2023-07-07

## 2023-07-07 VITALS
WEIGHT: 146 LBS | HEART RATE: 52 BPM | SYSTOLIC BLOOD PRESSURE: 111 MMHG | TEMPERATURE: 99 F | BODY MASS INDEX: 24.92 KG/M2 | HEIGHT: 64 IN | OXYGEN SATURATION: 98 % | RESPIRATION RATE: 18 BRPM | DIASTOLIC BLOOD PRESSURE: 56 MMHG

## 2023-07-07 LAB — CALCIUM BLD-MCNC: 9.3 MG/DL (ref 8.5–10.1)

## 2023-07-07 RX ORDER — ACETAMINOPHEN 325 MG/1
650 TABLET ORAL EVERY 6 HOURS PRN
Status: DISCONTINUED | OUTPATIENT
Start: 2023-07-07 | End: 2023-07-07

## 2023-07-07 RX ORDER — HYDROCODONE BITARTRATE AND ACETAMINOPHEN 5; 325 MG/1; MG/1
1 TABLET ORAL EVERY 6 HOURS PRN
Qty: 20 TABLET | Refills: 0 | Status: SHIPPED | OUTPATIENT
Start: 2023-07-07

## 2023-07-07 NOTE — TELEPHONE ENCOUNTER
Called pt in regards to recent surgery. Left a message to reach out with any questions or concerns.     Future Appointments   Date Time Provider Gaby Gaitan   7/14/2023  9:45 AM Yoseph Restrepo MD [facility]   8/25/2023  9:20 AM Grabiel Mott MD [facility] [address]

## 2023-07-07 NOTE — PLAN OF CARE
Pt VSS, AOx4. Up ad shay, steady gait. Voids in adequate amounts. No BM since prior to sx. Soft foods from regular diet menu tolerated well. Denies nausea. Given incentive spirometer; achieves over 1000 ml. Pt on room air; denies MERRILL/SOB/lightheadedness. Neurochecks WNL at this time. IVF discontinued; saline locked. Anterior neck incision remains covered by gauze and Tegaderm; dressing c/d/I. Neck symmetrical, no swelling, no redness. Pain managed with oxy 5 mg. Pt reminded on keeping HOB at least at 30 degrees. Fall & safety precautions in place. SCDs continued. POC discussed.

## 2023-07-07 NOTE — DISCHARGE INSTRUCTIONS
[address] [facility]  [place], [address] [facility]  [phone]    [address] [facility], [address] Anne-Marie Watts MD, MD Dionne Giordano MD Celestino Germany, MD  THYROIDECTOMY INSTRUCTIONS    What to Expect in the Postoperative Period  You will spend the night in the hospital.  During this time the wound is monitored, as are your calcium levels in the blood. Mild to moderate postoperative pain is normal.  This is easily controlled with medication. A surgical drain in the wound is sometimes necessary to prevent accumulation of fluid under the wound. This is removed before you leave the hospital.  Sore throat and discomfort with swallowing are due to general anesthesia and the surgery itself, and are short term only. Voice hoarseness may be temporary or permanent, depending on the nature of your thyroid problem. Usually the voice sounds normal within 1-2 weeks of surgery, but if not, please notify your surgeon. Decreased blood calcium levels may manifest as numbness and tingling around the mouth & fingers, spontaneous twitching in the face or mouth, and muscle spasm and cramping. Please notify your surgeon or go to the Emergency Room if this condition occurs. Treatment typically involves oral calcium supplements. Bleeding is rare after thyroid surgery. If this occurs, extensive swelling of the neck can occur and may need to be drained. Notify your surgeon or go the Emergency Room immediately. Activity and Restrictions  You will need to rest (off work or school) for 1 week after surgery. No vigorous activity, heavy lifting (greater than 20 pounds), bending or straining for 2 weeks. No driving for 1 week. Medication  You will be given a prescription for pain medication (do not drive or drink alcohol while taking this medication). You may also be given calcium supplements to prevent low calcium levels in the blood.     Follow up  You will be seen in the office 1 week after surgery (call for an appointment if you do not already have one. You may be instructed to follow-up with your primary care physician or an endocrinologist if long-term use of thyroid hormone replacement is necessary.

## 2023-07-07 NOTE — PROGRESS NOTES
Post op day 1  Patient doing well. Tolerating diet. Voice WNL. Wound no hematoma. Calcium 9.3. Discharge to home with pain meds as tolerated, po calcium. Follow up 1 week for suture removal.  Patient was given post op instruction sheet. Instructed to call for any problems per the instruction sheet.     Riley Calero MD  7/7/2023  8:10 AM

## 2023-07-07 NOTE — PROGRESS NOTES
Pt is alert and oriented x4. On room air. Neck symmetrical and dressing c/d/I with guaze and tegaderm. Denies tingling. Neuro check wdl. IS encouraged. Head of bed at 30 degrees. No problems swallowing. No swelling to site. SCDs in place. Up with standby. IV saline locked. Tolerating regular diet. Pain managed through mar. Plan of care discussed with pt and . Call light within reach. 1000: Paged MD about completing med rec for dc. Discharge instructions given. Pt and  in understanding. Leaving in stable condition.

## 2023-07-14 ENCOUNTER — OFFICE VISIT (OUTPATIENT)
Facility: LOCATION | Age: 63
End: 2023-07-14
Payer: COMMERCIAL

## 2023-07-14 DIAGNOSIS — C73 THYROID CANCER (HCC): Primary | ICD-10-CM

## 2023-07-14 PROCEDURE — 99024 POSTOP FOLLOW-UP VISIT: CPT | Performed by: OTOLARYNGOLOGY

## 2023-07-14 NOTE — PROGRESS NOTES
This patient is status post total thyroidectomy 7/6/2023. Voice is normal calcium levels are normal.  The wound looks good sutures were removed no evidence of infection. Pathology: Follicular and papillary carcinoma with 1 positive lymph node. 1 parathyroid was within the specimen. Plan: Patient and her  had numerous questions which were answered to their satisfaction. It is my recommendation that she see an endocrinologist for the purpose of obtaining postoperative radioactive iodine treatment. I gave her the name of Ida Phipps I have instructed her that she should get in to see the endocrine within 2 weeks timeframe. If there are any difficulties with this we will contact her personally. It would not be necessary to see me back unless there are issues regarding the surgical site.

## 2023-07-26 ENCOUNTER — OFFICE VISIT (OUTPATIENT)
Facility: CLINIC | Age: 63
End: 2023-07-26
Payer: COMMERCIAL

## 2023-07-26 ENCOUNTER — PATIENT MESSAGE (OUTPATIENT)
Facility: CLINIC | Age: 63
End: 2023-07-26

## 2023-07-26 VITALS — HEART RATE: 59 BPM | SYSTOLIC BLOOD PRESSURE: 124 MMHG | OXYGEN SATURATION: 99 % | DIASTOLIC BLOOD PRESSURE: 78 MMHG

## 2023-07-26 DIAGNOSIS — C73 PRIMARY THYROID CANCER (HCC): Primary | ICD-10-CM

## 2023-07-26 DIAGNOSIS — E89.0 POST-SURGICAL HYPOTHYROIDISM: ICD-10-CM

## 2023-07-26 PROCEDURE — 3078F DIAST BP <80 MM HG: CPT | Performed by: STUDENT IN AN ORGANIZED HEALTH CARE EDUCATION/TRAINING PROGRAM

## 2023-07-26 PROCEDURE — 3074F SYST BP LT 130 MM HG: CPT | Performed by: STUDENT IN AN ORGANIZED HEALTH CARE EDUCATION/TRAINING PROGRAM

## 2023-07-26 PROCEDURE — 99205 OFFICE O/P NEW HI 60 MIN: CPT | Performed by: STUDENT IN AN ORGANIZED HEALTH CARE EDUCATION/TRAINING PROGRAM

## 2023-07-26 RX ORDER — LEVOTHYROXINE SODIUM 88 UG/1
88 TABLET ORAL
Qty: 90 TABLET | Refills: 0 | Status: SHIPPED | OUTPATIENT
Start: 2023-07-26

## 2023-07-26 NOTE — PROGRESS NOTES
Endocrinology Clinic Note    Name: Suleiman Fischer    Date: 7/26/2023      HISTORY OF PRESENT ILLNESS   Suleiman Fischer is a 65year old female with PMHx significant for thyroid CA (follicular and papillary) s/p TT in July 2023 who presents for consultation for thyroid management. Initial HPI consult in July 2023    (-) Fhx of thyroid disease   Pt had herself felt L side nodule, seen by ENT and proceeded with workup. Surgery: Total thyroidectomy on 7/6/23 by Dr. Ting Perera showed: multifocal tumors  1)  larger tumor in L mid/upper lbe is follicular 4.6MF, focally extends to soft tissue margin, +capsular invasion, +focal angioinvasion  2) smaller tumor in L lower lobe is papillary 1.1cm focally extends to soft tissue margin  3) 1/1 +LN with calcifications  4) R inf parathyroid gland  Stage: LU7H7R (follicular), DI1AM3I (papillary)    Has seen Dr Andrew Truong for post-op f/u. Here to discuss RAIA. Feels well post-op, no hypocalcemia, no voice change. Daughter is getting  in the fall    REVIEW OF SYSTEMS  Ten point review of systems has been performed and is otherwise negative and/or non-contributory, except as described above. Medications:     Current Outpatient Medications:     levothyroxine 88 MCG Oral Tab, Take 1 tablet (88 mcg total) by mouth before breakfast., Disp: 90 tablet, Rfl: 0    Multiple Vitamins-Minerals (MULTI VITAMIN/MINERALS) Oral Tab, Take 1 tablet by mouth daily. , Disp: , Rfl:     acidophilus-pectin Oral Cap, Take 1 capsule by mouth daily. , Disp: , Rfl:     pyridoxine 100 MG Oral Tab, Take 1 tablet (100 mg total) by mouth daily. , Disp: , Rfl:     Calcium Carbonate-Vitamin D (CALCIUM + D OR), Take 2 tablets by mouth daily. , Disp: , Rfl:     atenolol 50 MG Oral Tab, TAKE 1 TABLET DAILY (COURTESY REFILL, NEED TO SCHEDULE MEDICATION FOLLOW UP APPOINTMENT), Disp: , Rfl:     Lisinopril-hydroCHLOROthiazide 20-12.5 MG Oral Tab, Take 1 tablet by mouth daily. , Disp: , Rfl: HYDROcodone-acetaminophen (NORCO) 5-325 MG Oral Tab, Take 1 tablet by mouth every 6 (six) hours as needed for Pain., Disp: 20 tablet, Rfl: 0     Allergies:     Codeine                 DIZZINESS  Seasonal                OTHER (SEE COMMENTS)    Comment:Migraines, Sinus Issues    Social History:   Social History    Socioeconomic History      Marital status:       Number of children: 2    Tobacco Use      Smoking status: Former        Packs/day: 0.25        Years: 2.00        Pack years: .5        Types: Cigarettes        Passive exposure: Never      Smokeless tobacco: Never      Tobacco comments: Smoked socially for 2 years in her 25s    Vaping Use      Vaping Use: Never used    Substance and Sexual Activity      Alcohol use: Yes        Alcohol/week: 3.0 standard drinks of alcohol        Types: 3 Glasses of wine per week      Drug use: Never      Sexual activity: Yes        Partners: Male    Other Topics      Concerns:        Exercise: Yes        Seat Belt: Yes        Stress Concern: Yes        Caffeine Concern: No        Special Diet: No        Weight Concern: No         Service: No        Blood Transfusions: No        Occupational Exposure: No        Hobby Hazards: No        Sleep Concern: No        Back Care: Yes        Bike Helmet: Yes        Self-Exams: Yes      Medical History:   Past Medical History:   Diagnosis Date    Allergic rhinitis     Disorder of thyroid     Essential hypertension     High blood pressure     History of COVID-19 05/2023    Pt had cough , sore throat and fatigue x 2 days.     Hx of motion sickness     Migraines     Multiple benign lumps of breast     Osteoarthritis     Osteopenia    Osteopenia     Primary thyroid cancer ([facility] [address] 7/26/2023    Visual impairment     glasses         Surgical history:   Past Surgical History:   Procedure Laterality Date    COLONOSCOPY  03/2022    HYSTERECTOMY      Partial hysterectomy, still has cervix    THYROIDECTOMY Bilateral 07/06/2023 PHYSICAL EXAMINATION:  /78   Pulse 59   SpO2 99%     CONSTITUTIONAL:  awake, alert, cooperative, no apparent distress, and appears stated age  PSYCH: normal affect  ENT:  Normocephalic, atraumatic  NECK:  Supple, symmetrical, thyroidectomy scar at base of neck, no palpable nodules/nodes, no tenderness  LUNGS: breathing comfortably  CARDIOVASCULAR:  regular rate       Labs:  Lab Results   Component Value Date    TSHT4 1.40 02/23/2023      Recent Labs     02/23/23  0756   TSHT4 1.40        Imaging:  No results found. 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 Ting Perera showed: multifocal tumors  1)  larger tumor in L mid/upper lbe is follicular 4.2UZ, focally extends to soft tissue margin, +capsular invasion, +focal angioinvasion  2) smaller tumor in L lower lobe is papillary 1.1cm focally extends to soft tissue margin  3) 1/1 +LN with calcifications  4) R inf parathyroid gland  Stage: AS5P4U (follicular), DA4OY5R (papillary)  Remnant ablation: recommended given low/intermediate BARB    Surveillance history  Thyroid US: ~Jan 2024  Labs: ordered    Discussed that post-surgical RAIA is generally favored in this case of BARB lowi/ntermediate risk . Pt and wife are aware that they will need to take RAIA precautions. Pt is currently 2-3  weeks post-op, not on LT4 yet. Will refer to Nuc med for pre-tx WBS, dosing of I-131 and post-tx WBS. TFTs and Tg ordered. 48 hrs after tx dose, can start LT4 88mcg PO qAM, with repeat TFTs in 6 -8 weeks.  Goal TSH 0.1-0.5. Ca/calcitriol management:  No paresthesias and post-op Ca not low. Plan to get thyroid US at ~6 months post-op (Jan 2024)  Will continue routine surveillance with imaging and biochemical studies      Return to Clinic in 3 months    No follow-ups on file. A total of 60 minutes was spent today on obtaining history, reviewing pertinent labs, evaluating patient, providing multiple treatment options, reinforcing diet/exercise and compliance, and completing documentation.      7/26/2023  Yamilet Pitts MD

## 2023-07-27 NOTE — TELEPHONE ENCOUNTER
Received call from Jluis Virk states \"I have patient on the phone asking about if her labs need to be fasting. \"     Confirmed per Dr. Cortney Johnston that patients labs do not need to be fasting.

## 2023-07-31 LAB
CALCIUM: 10.1 MG/DL (ref 8.6–10.4)
PARATHYROID HORMONE,$INTACT: 37 PG/ML (ref 16–77)
T4, FREE: 0.5 NG/DL (ref 0.8–1.8)
THYROGLOBULIN ANTIBODIES: <1 IU/ML
THYROGLOBULIN: 14.9 NG/ML
TSH: 38.77 MIU/L (ref 0.4–4.5)

## 2023-07-31 NOTE — TELEPHONE ENCOUNTER
Received fax from [address]- faxed to Uriel Rae in radiology at fax number: [phone]. Will await fax confirmation.

## 2023-07-31 NOTE — TELEPHONE ENCOUNTER
Received from Matt Ricks asking for patients lab results \"drawn Friday\". In our system they are still reading as active from V[facility] 22 phoned patient to no answer. LVM asking for call back to confirm that labs were drawn on Friday and at which lab they were drawn. RN phoned [email or web address] labs spoke with Gifty Maldonado, states that all but parathyroid lab have resulted. Will fax over partial results now, and send full when this comes through. Confirmed fax number with her. Will await fax.

## 2023-08-01 ENCOUNTER — HOSPITAL ENCOUNTER (OUTPATIENT)
Dept: NUCLEAR MEDICINE | Facility: HOSPITAL | Age: 63
Discharge: HOME | End: 2023-08-01
Attending: STUDENT IN AN ORGANIZED HEALTH CARE EDUCATION/TRAINING PROGRAM
Payer: COMMERCIAL

## 2023-08-01 DIAGNOSIS — E89.0 POST-SURGICAL HYPOTHYROIDISM: ICD-10-CM

## 2023-08-01 DIAGNOSIS — C73 PRIMARY THYROID CANCER (HCC): ICD-10-CM

## 2023-08-01 PROCEDURE — 78020 THYROID MET UPTAKE: CPT | Performed by: STUDENT IN AN ORGANIZED HEALTH CARE EDUCATION/TRAINING PROGRAM

## 2023-08-01 PROCEDURE — 78018 THYROID MET IMAGING BODY: CPT | Performed by: STUDENT IN AN ORGANIZED HEALTH CARE EDUCATION/TRAINING PROGRAM

## 2023-08-02 ENCOUNTER — HOSPITAL ENCOUNTER (OUTPATIENT)
Dept: NUCLEAR MEDICINE | Facility: HOSPITAL | Age: 63
Discharge: HOME | End: 2023-08-02
Attending: STUDENT IN AN ORGANIZED HEALTH CARE EDUCATION/TRAINING PROGRAM
Payer: COMMERCIAL

## 2023-08-04 ENCOUNTER — PATIENT MESSAGE (OUTPATIENT)
Facility: CLINIC | Age: 63
End: 2023-08-04

## 2023-08-07 ENCOUNTER — HOSPITAL ENCOUNTER (OUTPATIENT)
Dept: NUCLEAR MEDICINE | Facility: HOSPITAL | Age: 63
Discharge: HOME | End: 2023-08-07
Attending: STUDENT IN AN ORGANIZED HEALTH CARE EDUCATION/TRAINING PROGRAM
Payer: COMMERCIAL

## 2023-08-07 ENCOUNTER — TELEPHONE (OUTPATIENT)
Facility: CLINIC | Age: 63
End: 2023-08-07

## 2023-08-07 DIAGNOSIS — C73 PRIMARY THYROID CANCER (HCC): ICD-10-CM

## 2023-08-07 DIAGNOSIS — E89.0 POST-SURGICAL HYPOTHYROIDISM: ICD-10-CM

## 2023-08-07 PROCEDURE — 79005 NUCLEAR RX ORAL ADMIN: CPT | Performed by: STUDENT IN AN ORGANIZED HEALTH CARE EDUCATION/TRAINING PROGRAM

## 2023-08-07 NOTE — TELEPHONE ENCOUNTER
Patient phoned office to confirm when to start Levothyroxine after ablation. Confirmed that per Dr. Saulo Ochoa: patient is to start on Thursday morning. Patient wants confirmation on when to do next set of lab work. LOV notes: Will refer to Nuc med for pre-tx WBS, dosing of I-131 and post-tx WBS. TFTs and Tg ordered. 48 hrs after tx dose, can start LT4 88mcg PO qAM, with repeat TFTs in 6 -8 weeks. Goal TSH 0.1-0.5.     Routed to Dr. Saulo Ochoa

## 2023-08-07 NOTE — TELEPHONE ENCOUNTER
[facility] sent to patient per Dr. Jake Emmaunel note \"Either 6 weeks from starting LT4 or before next endo visit, whichever comes first\"     Let patient know that 6 weeks from starting medication would be around 9/21.  Labs in system for [employer].

## 2023-08-07 NOTE — TELEPHONE ENCOUNTER
[facility] sent to patient with note from Dr. Walter Thapa: patient can start medication on Thursday.

## 2023-08-11 ENCOUNTER — HOSPITAL ENCOUNTER (OUTPATIENT)
Dept: NUCLEAR MEDICINE | Facility: HOSPITAL | Age: 63
End: 2023-08-11
Attending: STUDENT IN AN ORGANIZED HEALTH CARE EDUCATION/TRAINING PROGRAM
Payer: COMMERCIAL

## 2023-08-14 ENCOUNTER — PATIENT MESSAGE (OUTPATIENT)
Facility: CLINIC | Age: 63
End: 2023-08-14

## 2023-08-14 ENCOUNTER — HOSPITAL ENCOUNTER (OUTPATIENT)
Dept: NUCLEAR MEDICINE | Facility: HOSPITAL | Age: 63
Discharge: HOME | End: 2023-08-14
Attending: STUDENT IN AN ORGANIZED HEALTH CARE EDUCATION/TRAINING PROGRAM
Payer: COMMERCIAL

## 2023-08-14 DIAGNOSIS — C73 PRIMARY THYROID CANCER (HCC): ICD-10-CM

## 2023-08-14 DIAGNOSIS — E89.0 POST-SURGICAL HYPOTHYROIDISM: ICD-10-CM

## 2023-08-14 PROCEDURE — 78018 THYROID MET IMAGING BODY: CPT | Performed by: STUDENT IN AN ORGANIZED HEALTH CARE EDUCATION/TRAINING PROGRAM

## 2023-08-14 NOTE — TELEPHONE ENCOUNTER
[facility] routed to Dr. Dee Joel for review. \"  Melvi Joel,   I just read the test results from my WBS today, 8/14/2023. Should I expect a call from you to review/discuss the findings? I'd like to confirm what I understand them to be.    Rosemarie Lugo"

## 2023-08-14 NOTE — TELEPHONE ENCOUNTER
From: Yonis Ayon  To: Velvet Freeman MD  Sent: 8/14/2023 2:59 PM CDT  Subject: WBS Findings 8/14/2023 - RON Roland,     I just read the test results from my WBS today, 8/14/2023. Should I expect a call from you to review/discuss the findings? I'd like to confirm what I understand them to be.      Jodie Ogden

## 2023-08-25 ENCOUNTER — OFFICE VISIT (OUTPATIENT)
Dept: INTERNAL MEDICINE CLINIC | Facility: CLINIC | Age: 63
End: 2023-08-25
Payer: COMMERCIAL

## 2023-08-25 VITALS
BODY MASS INDEX: 25.79 KG/M2 | TEMPERATURE: 98 F | DIASTOLIC BLOOD PRESSURE: 80 MMHG | RESPIRATION RATE: 12 BRPM | HEART RATE: 56 BPM | WEIGHT: 147.38 LBS | SYSTOLIC BLOOD PRESSURE: 130 MMHG | HEIGHT: 63.25 IN

## 2023-08-25 DIAGNOSIS — Z13.29 SCREENING FOR ENDOCRINE DISORDER: ICD-10-CM

## 2023-08-25 DIAGNOSIS — Z00.00 PHYSICAL EXAM, ANNUAL: Primary | ICD-10-CM

## 2023-08-25 DIAGNOSIS — C73 PRIMARY THYROID CANCER (HCC): ICD-10-CM

## 2023-08-25 DIAGNOSIS — I10 ESSENTIAL HYPERTENSION: ICD-10-CM

## 2023-08-25 DIAGNOSIS — Z12.31 ENCOUNTER FOR SCREENING MAMMOGRAM FOR MALIGNANT NEOPLASM OF BREAST: ICD-10-CM

## 2023-08-25 DIAGNOSIS — E89.0 POST-SURGICAL HYPOTHYROIDISM: ICD-10-CM

## 2023-08-25 PROCEDURE — 3079F DIAST BP 80-89 MM HG: CPT | Performed by: INTERNAL MEDICINE

## 2023-08-25 PROCEDURE — 3075F SYST BP GE 130 - 139MM HG: CPT | Performed by: INTERNAL MEDICINE

## 2023-08-25 PROCEDURE — 3008F BODY MASS INDEX DOCD: CPT | Performed by: INTERNAL MEDICINE

## 2023-08-25 PROCEDURE — 99214 OFFICE O/P EST MOD 30 MIN: CPT | Performed by: INTERNAL MEDICINE

## 2023-08-25 PROCEDURE — 99396 PREV VISIT EST AGE 40-64: CPT | Performed by: INTERNAL MEDICINE

## 2023-08-27 ENCOUNTER — PATIENT MESSAGE (OUTPATIENT)
Dept: INTERNAL MEDICINE CLINIC | Facility: CLINIC | Age: 63
End: 2023-08-27

## 2023-08-28 NOTE — TELEPHONE ENCOUNTER
From: Junaid Koroma  To: Kelsea Rizo MD  Sent: 8/27/2023 1:26 PM CDT  Subject: Labs ordered - RON Perales visit 8/25/23    Dr. Jignesh Vallecillo mentioned she'd like me to have blood work done in the next month -it requires me to fast. There is not a doctor's order for that in [address] In my After Visit Summary it says the bloodwork was \"done today\" and it was not. Please let me know if the order was already sent to [address] [place] [address] labs, or if i should expect an [facility] in the very near future.      Mary Lou Chaves

## 2023-09-09 LAB
ABSOLUTE BASOPHILS: 32 CELLS/UL (ref 0–200)
ABSOLUTE EOSINOPHILS: 78 CELLS/UL (ref 15–500)
ABSOLUTE LYMPHOCYTES: 865 CELLS/UL (ref 850–3900)
ABSOLUTE MONOCYTES: 340 CELLS/UL (ref 200–950)
ABSOLUTE NEUTROPHILS: 3284 CELLS/UL (ref 1500–7800)
BASOPHILS: 0.7 %
CHOL/HDLC RATIO: 3.4 (CALC)
CHOLESTEROL, TOTAL: 183 MG/DL
EOSINOPHILS: 1.7 %
HDL CHOLESTEROL: 54 MG/DL
HEMATOCRIT: 37.9 % (ref 35–45)
HEMOGLOBIN A1C: 5.2 % OF TOTAL HGB
HEMOGLOBIN: 12.6 G/DL (ref 11.7–15.5)
LDL-CHOLESTEROL: 112 MG/DL (CALC)
LYMPHOCYTES: 18.8 %
MCH: 31.4 PG (ref 27–33)
MCHC: 33.2 G/DL (ref 32–36)
MCV: 94.5 FL (ref 80–100)
MONOCYTES: 7.4 %
MPV: 11.8 FL (ref 7.5–12.5)
NEUTROPHILS: 71.4 %
NON-HDL CHOLESTEROL: 129 MG/DL (CALC)
PLATELET COUNT: 132 THOUSAND/UL (ref 140–400)
RDW: 12.7 % (ref 11–15)
RED BLOOD CELL COUNT: 4.01 MILLION/UL (ref 3.8–5.1)
TRIGLYCERIDES: 77 MG/DL
WHITE BLOOD CELL COUNT: 4.6 THOUSAND/UL (ref 3.8–10.8)

## 2023-09-13 ENCOUNTER — PATIENT MESSAGE (OUTPATIENT)
Dept: INTERNAL MEDICINE CLINIC | Facility: CLINIC | Age: 63
End: 2023-09-13

## 2023-09-13 ENCOUNTER — TELEPHONE (OUTPATIENT)
Facility: CLINIC | Age: 63
End: 2023-09-13

## 2023-09-13 DIAGNOSIS — E89.0 POST-SURGICAL HYPOTHYROIDISM: ICD-10-CM

## 2023-09-13 DIAGNOSIS — C73 PRIMARY THYROID CANCER (HCC): Primary | ICD-10-CM

## 2023-09-20 ENCOUNTER — APPOINTMENT (OUTPATIENT)
Dept: GENERAL RADIOLOGY | Facility: HOSPITAL | Age: 63
End: 2023-09-20
Attending: EMERGENCY MEDICINE
Payer: COMMERCIAL

## 2023-09-20 ENCOUNTER — HOSPITAL ENCOUNTER (EMERGENCY)
Facility: HOSPITAL | Age: 63
Discharge: HOME | End: 2023-09-20
Attending: EMERGENCY MEDICINE
Payer: COMMERCIAL

## 2023-09-20 VITALS
RESPIRATION RATE: 20 BRPM | SYSTOLIC BLOOD PRESSURE: 147 MMHG | HEART RATE: 49 BPM | DIASTOLIC BLOOD PRESSURE: 73 MMHG | TEMPERATURE: 98 F | OXYGEN SATURATION: 100 %

## 2023-09-20 DIAGNOSIS — S93.104A TOE DISLOCATION, RIGHT, INITIAL ENCOUNTER: Primary | ICD-10-CM

## 2023-09-20 PROCEDURE — 99284 EMERGENCY DEPT VISIT MOD MDM: CPT

## 2023-09-20 PROCEDURE — 73630 X-RAY EXAM OF FOOT: CPT | Performed by: EMERGENCY MEDICINE

## 2023-09-20 PROCEDURE — 28660 TREAT TOE DISLOCATION: CPT

## 2023-09-20 PROCEDURE — 99283 EMERGENCY DEPT VISIT LOW MDM: CPT

## 2023-09-20 RX ORDER — IBUPROFEN 200 MG
200 TABLET ORAL ONCE
Status: COMPLETED | OUTPATIENT
Start: 2023-09-20 | End: 2023-09-20

## 2023-09-20 RX ORDER — ACETAMINOPHEN 500 MG
500 TABLET ORAL ONCE
Status: COMPLETED | OUTPATIENT
Start: 2023-09-20 | End: 2023-09-20

## 2023-09-20 NOTE — ED INITIAL ASSESSMENT (HPI)
Pt arrives to ED s/p slipping on water in the bathroom this morning. Pt presents with right 4th toe deformity, unable to bear weight on right foot. Pt did not hit head or lose consciousness.

## 2023-09-22 ENCOUNTER — TELEPHONE (OUTPATIENT)
Facility: CLINIC | Age: 63
End: 2023-09-22

## 2023-09-22 ENCOUNTER — OFFICE VISIT (OUTPATIENT)
Dept: INTERNAL MEDICINE CLINIC | Facility: CLINIC | Age: 63
End: 2023-09-22
Payer: COMMERCIAL

## 2023-09-22 VITALS
WEIGHT: 147 LBS | BODY MASS INDEX: 25.72 KG/M2 | OXYGEN SATURATION: 98 % | HEIGHT: 63.25 IN | TEMPERATURE: 98 F | HEART RATE: 92 BPM | RESPIRATION RATE: 20 BRPM | DIASTOLIC BLOOD PRESSURE: 60 MMHG | SYSTOLIC BLOOD PRESSURE: 108 MMHG

## 2023-09-22 DIAGNOSIS — Z23 NEED FOR VACCINATION: ICD-10-CM

## 2023-09-22 DIAGNOSIS — S93.104S: ICD-10-CM

## 2023-09-22 LAB — TSH W/REFLEX TO FT4: 0.98 MIU/L (ref 0.4–4.5)

## 2023-09-22 PROCEDURE — 3074F SYST BP LT 130 MM HG: CPT | Performed by: INTERNAL MEDICINE

## 2023-09-22 PROCEDURE — 99213 OFFICE O/P EST LOW 20 MIN: CPT | Performed by: INTERNAL MEDICINE

## 2023-09-22 PROCEDURE — 3078F DIAST BP <80 MM HG: CPT | Performed by: INTERNAL MEDICINE

## 2023-09-22 PROCEDURE — 90686 IIV4 VACC NO PRSV 0.5 ML IM: CPT | Performed by: INTERNAL MEDICINE

## 2023-09-22 PROCEDURE — 3008F BODY MASS INDEX DOCD: CPT | Performed by: INTERNAL MEDICINE

## 2023-09-22 PROCEDURE — 90471 IMMUNIZATION ADMIN: CPT | Performed by: INTERNAL MEDICINE

## 2023-09-22 NOTE — TELEPHONE ENCOUNTER
Received fax from [facility] with patients TSH w/felex to FT4 test results. Placed in MD in basket for review.

## 2023-10-06 ENCOUNTER — MED REC SCAN ONLY (OUTPATIENT)
Facility: CLINIC | Age: 63
End: 2023-10-06

## 2023-10-26 ENCOUNTER — OFFICE VISIT (OUTPATIENT)
Facility: CLINIC | Age: 63
End: 2023-10-26

## 2023-10-26 VITALS
BODY MASS INDEX: 25.2 KG/M2 | WEIGHT: 144 LBS | HEIGHT: 63.25 IN | SYSTOLIC BLOOD PRESSURE: 110 MMHG | DIASTOLIC BLOOD PRESSURE: 68 MMHG | OXYGEN SATURATION: 99 % | HEART RATE: 50 BPM

## 2023-10-26 DIAGNOSIS — C73 PRIMARY THYROID CANCER (HCC): Primary | ICD-10-CM

## 2023-10-26 DIAGNOSIS — E89.0 POST-SURGICAL HYPOTHYROIDISM: ICD-10-CM

## 2023-10-26 PROCEDURE — 99214 OFFICE O/P EST MOD 30 MIN: CPT | Performed by: STUDENT IN AN ORGANIZED HEALTH CARE EDUCATION/TRAINING PROGRAM

## 2023-10-26 PROCEDURE — 3008F BODY MASS INDEX DOCD: CPT | Performed by: STUDENT IN AN ORGANIZED HEALTH CARE EDUCATION/TRAINING PROGRAM

## 2023-10-26 PROCEDURE — 3074F SYST BP LT 130 MM HG: CPT | Performed by: STUDENT IN AN ORGANIZED HEALTH CARE EDUCATION/TRAINING PROGRAM

## 2023-10-26 PROCEDURE — 3078F DIAST BP <80 MM HG: CPT | Performed by: STUDENT IN AN ORGANIZED HEALTH CARE EDUCATION/TRAINING PROGRAM

## 2023-10-26 NOTE — PROGRESS NOTES
Endocrinology Clinic Note    Name: Davina Neumann    Date: 10/26/2023      HISTORY OF PRESENT ILLNESS   Davina Neumann is a 59year old female with PMHx significant for thyroid CA (follicular and papillary) s/p TT in July 2023 who presents for consultation for thyroid management. Initial HPI consult in July 2023  (-) Fhx of thyroid disease   Pt had herself felt L side nodule, seen by ENT and proceeded with workup. Surgery: Total thyroidectomy on 7/6/23 by Dr. Mccrary Later showed: multifocal tumors  1)  larger tumor in L mid/upper lbe is follicular 1.4FS, focally extends to soft tissue margin, +capsular invasion, +focal angioinvasion  2) smaller tumor in L lower lobe is papillary 1.1cm focally extends to soft tissue margin  3) 1/1 +LN with calcifications  4) R inf parathyroid gland  Stage: NI0T9A (follicular), WT9ST6M (papillary)    Has seen Dr Josiane Cardona for post-op f/u. Here to discuss RAIA. Feels well post-op, no hypocalcemia, no voice change. Daughter is getting  in the fall    Interim hx:  Oct 2023  8/7/2023 - received 150.1 mCi I-131; post-treatment WBS showed intense uptake b/l in thyroid bed, no actiity outside of thyroid bed  9/2023 - TSH 0.98, no fT4 drawn by [facility] (ordered the same date) -- on LT4 88mcg  Follow - Up: Pt here to f/u for Primary thyroid cancer and post-surgical hypothyroidism. C/o fatigue towards the end of the day, tenderness on her right side of face-from ear to mid neck area, ongoing since treatment. Also feels lump in throat  Eating well   Feels globus sensation; more noticeable when she's focused on it  No voice hoarseness  Doesn't have compressive sx when she eats    REVIEW OF SYSTEMS  Ten point review of systems has been performed and is otherwise negative and/or non-contributory, except as described above.     Medications:     Current Outpatient Medications:     levothyroxine 88 MCG Oral Tab, Take 1 tablet (88 mcg total) by mouth before breakfast., Disp: 90 tablet, Rfl: 0    Multiple Vitamins-Minerals (MULTI VITAMIN/MINERALS) Oral Tab, Take 1 tablet by mouth daily. , Disp: , Rfl:     acidophilus-pectin Oral Cap, Take 1 capsule by mouth daily. , Disp: , Rfl:     pyridoxine 100 MG Oral Tab, Take 1 tablet (100 mg total) by mouth daily. , Disp: , Rfl:     Calcium Carbonate-Vitamin D (CALCIUM + D OR), Take 2 tablets by mouth daily. , Disp: , Rfl:     atenolol 50 MG Oral Tab, TAKE 1 TABLET DAILY (COURTESY REFILL, NEED TO SCHEDULE MEDICATION FOLLOW UP APPOINTMENT), Disp: , Rfl:     Lisinopril-hydroCHLOROthiazide 20-12.5 MG Oral Tab, Take 1 tablet by mouth daily. , Disp: , Rfl:      Allergies:     Codeine                 DIZZINESS  Seasonal                OTHER (SEE COMMENTS)    Comment:Migraines, Sinus Issues    Social History:   Social History    Socioeconomic History      Marital status:       Number of children: 2    Tobacco Use      Smoking status: Former        Packs/day: 0.25        Years: 2.00        Additional pack years: 0.00        Total pack years: 0.50        Types: Cigarettes        Passive exposure: Never      Smokeless tobacco: Never      Tobacco comments: Smoked socially for 2 years in her 25s    Vaping Use      Vaping Use: Never used    Substance and Sexual Activity      Alcohol use: Not Currently        Comment: approx 2-3 drinks per week      Drug use: Never      Sexual activity: Yes        Partners: Male    Other Topics      Concerns:        Exercise: Yes        Seat Belt: Yes        Stress Concern: Yes        Caffeine Concern: No        Special Diet: No        Weight Concern: No         Service: No        Blood Transfusions: No        Occupational Exposure: No        Hobby Hazards: No        Sleep Concern: No        Back Care: Yes        Bike Helmet: Yes        Self-Exams: Yes      Medical History:   Past Medical History:   Diagnosis Date    Allergic rhinitis     Disorder of thyroid     Essential hypertension     High blood pressure     History of COVID-19 05/2023    Pt had cough , sore throat and fatigue x 2 days. Hx of motion sickness     Migraines     Multiple benign lumps of breast     Osteoarthritis     Osteopenia    Osteopenia     Primary thyroid cancer ([facility] [address] 7/26/2023    Visual impairment     glasses         Surgical history:   Past Surgical History:   Procedure Laterality Date    COLONOSCOPY  03/2022    HYSTERECTOMY      Partial hysterectomy, still has cervix    THYROIDECTOMY Bilateral 07/06/2023       PHYSICAL EXAMINATION:  There were no vitals taken for this visit. CONSTITUTIONAL:  awake, alert, cooperative, no apparent distress, and appears stated age  PSYCH: normal affect  ENT:  Normocephalic, atraumatic  NECK:  Supple, symmetrical, thyroidectomy scar at base of neck, no palpable nodules/nodes, no tenderness  LUNGS: breathing comfortably  CARDIOVASCULAR:  regular rate       Labs:  Lab Results   Component Value Date    T4F 0.5 (L) 07/28/2023    TSH 38.77 (H) 07/28/2023    TSHT4 0.98 09/21/2023      Recent Labs     07/28/23  0727 09/21/23  0722   T4F 0.5*  --    TSH 38.77*  --    TSHT4  --  0.98        Imaging:  No results found. ASSESSMENT/PLAN:    (C73) Primary thyroid cancer ([facility] [address]  (primary encounter diagnosis)  (E89.0) Post-surgical hypothyroidism  Plan:     Treatment history  Surgery:  Total thyroidectomy on 7/6/23 by Dr. Seng Olivarez showed: multifocal tumors  1)  larger tumor in L mid/upper lbe is follicular 6.7OJ, focally extends to soft tissue margin, +capsular invasion, +focal angioinvasion  2) smaller tumor in L lower lobe is papillary 1.1cm focally extends to soft tissue margin  3) 1/1 +LN with calcifications  4) R inf parathyroid gland  Stage: RL4J8L (follicular), PP6OF2Z (papillary)    Remnant ablation:   8/7/2023 - received 150.1 mCi I-131; post-treatment WBS showed intense uptake b/l in thyroid bed, no actiity outside of thyroid bed    Surveillance history  Thyroid US: ~Jan 2024  Labs:   9/2023 - TSH 0.98, no fT4 drawn by [facility] (ordered the same date) -- on LT4 88mcg    - Goal TSH 0.1-0.5  - continue LT4 88mcg, repeat labs now and prior to next appointment  - Plan to get thyroid US at ~6 months post-op (Jan 2024)  - ?globus sensation - 2 week trial of PPI; if refractory, to see ENT; no compressive sx  Will continue routine surveillance with imaging and biochemical studies      Return to Clinic in 3 months    No follow-ups on file. A total of 35 minutes was spent today on obtaining history, reviewing pertinent labs, evaluating patient, providing multiple treatment options, reinforcing diet/exercise and compliance, and completing documentation.      10/26/2023  Jefry Vargas MD

## 2023-10-26 NOTE — PATIENT INSTRUCTIONS
- [facility] labs now, I will let you know if dose change needed  - 3 month return visit; get thyroid US and another [facility] lab set prior ~Jan 2024  - 2 week trial of OTC proton pump inhibitor to see if globus sensation goes away

## 2023-11-04 DIAGNOSIS — C73 PRIMARY THYROID CANCER (HCC): ICD-10-CM

## 2023-11-04 DIAGNOSIS — E89.0 POST-SURGICAL HYPOTHYROIDISM: ICD-10-CM

## 2023-11-06 NOTE — TELEPHONE ENCOUNTER
LOV: 10/26    RTC: 3 months     FU: 01/15    Last Refill:     Month Supply [facility] days supply    Last office visit note: - continue LT4 88mcg, repeat labs now and prior to next appointment  - Plan to get thyroid US at ~6 months post-op (2024)    Patient states that she is getting her labs done today. Will wait for results and see if dose needs titration. 98.6

## 2023-11-07 DIAGNOSIS — C73 PRIMARY THYROID CANCER (HCC): ICD-10-CM

## 2023-11-07 DIAGNOSIS — E89.0 POST-SURGICAL HYPOTHYROIDISM: ICD-10-CM

## 2023-11-07 LAB
T3, TOTAL: 119 NG/DL (ref 76–181)
T4, FREE: 1.4 NG/DL (ref 0.8–1.8)
TSH: 0.22 MIU/L (ref 0.4–4.5)

## 2023-11-07 RX ORDER — LEVOTHYROXINE SODIUM 88 UG/1
88 TABLET ORAL
Qty: 90 TABLET | Refills: 0 | Status: SHIPPED | OUTPATIENT
Start: 2023-11-07

## 2023-11-07 RX ORDER — LEVOTHYROXINE SODIUM 88 UG/1
88 TABLET ORAL
Qty: 90 TABLET | Refills: 0 | OUTPATIENT
Start: 2023-11-07

## 2023-11-07 NOTE — TELEPHONE ENCOUNTER
Labs back in system:    Component      Latest Ref Rng 11/6/2023   TSH      0.40 - 4.50 mIU/L 0.22 (L)    T4,Free (Direct)      0.8 - 1.8 ng/dL 1.4    T3 TOTAL      76 - 181 ng/dL 119       Legend:  (L) Low

## 2023-11-07 NOTE — TELEPHONE ENCOUNTER
Patient phoned office. States she took last thyroid pill today. Is looking for interpretation of lab results/new rx.     Reviewed Dr. Glynn Loja is out of office until tomorrow AM.

## 2023-11-08 ENCOUNTER — PATIENT MESSAGE (OUTPATIENT)
Facility: CLINIC | Age: 63
End: 2023-11-08

## 2023-11-09 NOTE — TELEPHONE ENCOUNTER
From: Pj Shankar  To: Robert Monzon  Sent: 11/8/2023 7:12 PM CST  Subject: Thank you Lissette Marie,   Thank you for sending the messages today regarding the lab results and Dr. Joseline Burrows comments. I did  my refill yesterday just in time, and appreciate all you did to make that happen. Have a great weekend!     Chelsey Harrison

## 2023-11-24 ENCOUNTER — HOSPITAL ENCOUNTER (OUTPATIENT)
Dept: MAMMOGRAPHY | Age: 63
Discharge: HOME | End: 2023-11-24
Attending: INTERNAL MEDICINE
Payer: COMMERCIAL

## 2023-11-24 DIAGNOSIS — Z12.31 ENCOUNTER FOR SCREENING MAMMOGRAM FOR MALIGNANT NEOPLASM OF BREAST: ICD-10-CM

## 2023-11-24 PROCEDURE — 77063 BREAST TOMOSYNTHESIS BI: CPT | Performed by: INTERNAL MEDICINE

## 2023-11-24 PROCEDURE — 77067 SCR MAMMO BI INCL CAD: CPT | Performed by: INTERNAL MEDICINE

## 2023-11-30 ENCOUNTER — TELEPHONE (OUTPATIENT)
Dept: INTERNAL MEDICINE CLINIC | Facility: CLINIC | Age: 63
End: 2023-11-30

## 2023-11-30 NOTE — TELEPHONE ENCOUNTER
Incoming (mail or fax):  Mail  Received from:  [employer]  Documentation given to:  Triage    Please call when completed:  [phone]

## 2023-12-02 ENCOUNTER — PATIENT MESSAGE (OUTPATIENT)
Dept: INTERNAL MEDICINE CLINIC | Facility: CLINIC | Age: 63
End: 2023-12-02

## 2023-12-04 NOTE — TELEPHONE ENCOUNTER
From: Johanne Castillo  To: Emil Szymanski  Sent: 12/2/2023 7:34 AM CST  Subject: RON [facility] Form    An insurance form was dropped off at the office on Thursday, Nov 30, to be completed for my 2024 coverage. Please let me know when I can come in to  the completed form, and your office hours for the week.      Raiza Read

## 2023-12-11 NOTE — TELEPHONE ENCOUNTER
Patient called to find out the status of paperwork. She said she was suppose to  paperwork. I explained it was mailed and pt was notified. She said she was never notified.  She will follow up with Insurance

## 2023-12-18 ENCOUNTER — PATIENT MESSAGE (OUTPATIENT)
Dept: INTERNAL MEDICINE CLINIC | Facility: CLINIC | Age: 63
End: 2023-12-18

## 2023-12-19 RX ORDER — ATENOLOL 50 MG/1
TABLET ORAL
Qty: 90 TABLET | Refills: 0 | Status: SHIPPED | OUTPATIENT
Start: 2023-12-19

## 2023-12-19 RX ORDER — LISINOPRIL AND HYDROCHLOROTHIAZIDE 20; 12.5 MG/1; MG/1
1 TABLET ORAL DAILY
Qty: 90 TABLET | Refills: 0 | Status: SHIPPED | OUTPATIENT
Start: 2023-12-19

## 2023-12-19 NOTE — TELEPHONE ENCOUNTER
Last OV relevant to medication: 8/25//23  Last refill date: historical- from previous PCP  When pt was asked to return for OV: 2/25/24  Upcoming appt/reason:   Future Appointments   Date Time Provider Gaby Gaitan   1/15/2024 10:00 AM Dutch Gtz MD [facility] [place]   3/1/2024  9:20 AM Clarissa Perez MD [facility] [address] [place]   8/30/2024  9:20 AM Clarissa Perez MD [facility] [address] [place]   Was pt informed of any over due labs: utd  Lab Results   Component Value Date    GLU 85 05/20/2023    BUN 22 (H) 05/20/2023    CREATSERUM 0.80 05/20/2023    ANIONGAP 5 05/20/2023    CA 10.1 07/28/2023    OSMOCALC 291 05/20/2023    ALKPHO 78 05/20/2023    AST 21 05/20/2023    ALT 27 05/20/2023    BILT 0.4 05/20/2023    TP 7.4 05/20/2023    ALB 3.5 05/20/2023    GLOBULIN 3.9 05/20/2023     05/20/2023    K 3.7 05/20/2023     05/20/2023    CO2 29.0 05/20/2023

## 2023-12-19 NOTE — TELEPHONE ENCOUNTER
From: Supriya Gordon  To: Jennifer Klein  Sent: 12/18/2023 5:59 PM CST  Subject: Prescriptions - RON Perales    I received a notification from [employer] mail order that my prescriptions are to be refilled before Royce 3, 2024 however, they were originally prescribed by my former PCP. The prescriptions are for Atelenol 50 mg TAB and lisinopril 20-12.5 TAB. How can I go about getting these refilled now under Dr. Chandra aranda?

## 2023-12-28 LAB
T3, TOTAL: 102 NG/DL (ref 76–181)
T4, FREE: 1.4 NG/DL (ref 0.8–1.8)
THYROGLOBULIN ANTIBODIES: <1 IU/ML
THYROGLOBULIN: <0.1 NG/ML
TSH: 0.68 MIU/L (ref 0.4–4.5)

## 2024-01-12 ENCOUNTER — APPOINTMENT (OUTPATIENT)
Dept: ULTRASOUND IMAGING | Age: 64
End: 2024-01-12
Attending: STUDENT IN AN ORGANIZED HEALTH CARE EDUCATION/TRAINING PROGRAM
Payer: COMMERCIAL

## 2024-01-12 ENCOUNTER — HOSPITAL ENCOUNTER (OUTPATIENT)
Dept: ULTRASOUND IMAGING | Facility: HOSPITAL | Age: 64
Discharge: HOME | End: 2024-01-12
Attending: STUDENT IN AN ORGANIZED HEALTH CARE EDUCATION/TRAINING PROGRAM
Payer: COMMERCIAL

## 2024-01-12 DIAGNOSIS — E89.0 POST-SURGICAL HYPOTHYROIDISM: ICD-10-CM

## 2024-01-12 DIAGNOSIS — C73 PRIMARY THYROID CANCER (HCC): ICD-10-CM

## 2024-01-12 PROCEDURE — 76536 US EXAM OF HEAD AND NECK: CPT | Performed by: STUDENT IN AN ORGANIZED HEALTH CARE EDUCATION/TRAINING PROGRAM

## 2024-01-15 ENCOUNTER — OFFICE VISIT (OUTPATIENT)
Facility: CLINIC | Age: 64
End: 2024-01-15
Payer: COMMERCIAL

## 2024-01-15 VITALS — SYSTOLIC BLOOD PRESSURE: 116 MMHG | OXYGEN SATURATION: 98 % | DIASTOLIC BLOOD PRESSURE: 70 MMHG | HEART RATE: 50 BPM

## 2024-01-15 DIAGNOSIS — E89.0 POST-SURGICAL HYPOTHYROIDISM: Primary | ICD-10-CM

## 2024-01-15 DIAGNOSIS — C73 PRIMARY THYROID CANCER (HCC): ICD-10-CM

## 2024-01-15 PROCEDURE — 99214 OFFICE O/P EST MOD 30 MIN: CPT | Performed by: STUDENT IN AN ORGANIZED HEALTH CARE EDUCATION/TRAINING PROGRAM

## 2024-01-15 PROCEDURE — 3074F SYST BP LT 130 MM HG: CPT | Performed by: STUDENT IN AN ORGANIZED HEALTH CARE EDUCATION/TRAINING PROGRAM

## 2024-01-15 PROCEDURE — 3078F DIAST BP <80 MM HG: CPT | Performed by: STUDENT IN AN ORGANIZED HEALTH CARE EDUCATION/TRAINING PROGRAM

## 2024-01-15 RX ORDER — LEVOTHYROXINE SODIUM 88 UG/1
88 TABLET ORAL
Qty: 90 TABLET | Refills: 1 | Status: SHIPPED | OUTPATIENT
Start: 2024-01-15

## 2024-01-15 NOTE — PROGRESS NOTES
Endocrinology Clinic Note    Name: Diana Perales    Date: 1/15/2024      HISTORY OF PRESENT ILLNESS   Diana Perales is a 63 year old female with PMHx significant for thyroid CA (follicular and papillary) s/p TT in July 2023 who presents for consultation for thyroid management.    Initial HPI consult in July 2023  (-) Fhx of thyroid disease   Pt had herself felt L side nodule, seen by ENT and proceeded with workup.    Surgery: Total thyroidectomy on 7/6/23 by Dr. Okeefe   Path showed: multifocal tumors  1)  larger tumor in L mid/upper lbe is follicular 2.6cm, focally extends to soft tissue margin, +capsular invasion, +focal angioinvasion  2) smaller tumor in L lower lobe is papillary 1.1cm focally extends to soft tissue margin  3) 1/1 +LN with calcifications  4) R inf parathyroid gland  Stage: pT2N0a (follicular), vR1lE5s (papillary)    Has seen Dr Okeefe for post-op f/u. Here to discuss RAIA.  Feels well post-op, no hypocalcemia, no voice change.  Daughter is getting  in the fall    Interim hx:  Oct 2023  8/7/2023 - received 150.1 mCi I-131; post-treatment WBS showed intense uptake b/l in thyroid bed, no actiity outside of thyroid bed  9/2023 - TSH 0.98, no fT4 drawn by [facility] (ordered the same date) -- on LT4 88mcg  Eating well   Feels globus sensation; more noticeable when she's focused on it  No voice hoarseness  Doesn't have compressive sx when she eats    Jan 2024 12/2023 - fT4 1.4, TT3 102, TSH 0.68, Tg undetectable, Tg ab neg  1/2024 - thyroid US shows s/p TT, no abnl soft tissues in thyroidectomy bed    REVIEW OF SYSTEMS  Ten point review of systems has been performed and is otherwise negative and/or non-contributory, except as described above.    Medications:     Current Outpatient Medications:     atenolol 50 MG Oral Tab, TAKE 1 TABLET BY MOUTH DAILY, Disp: 90 tablet, Rfl: 0    lisinopril-hydroCHLOROthiazide 20-12.5 MG Oral Tab, Take 1 tablet by mouth daily., Disp: 90 tablet, Rfl: 0     levothyroxine 88 MCG Oral Tab, Take 1 tablet (88 mcg total) by mouth before breakfast., Disp: 90 tablet, Rfl: 0    Multiple Vitamins-Minerals (MULTI VITAMIN/MINERALS) Oral Tab, Take 1 tablet by mouth daily., Disp: , Rfl:     acidophilus-pectin Oral Cap, Take 1 capsule by mouth daily., Disp: , Rfl:     pyridoxine 100 MG Oral Tab, Take 1 tablet (100 mg total) by mouth daily., Disp: , Rfl:     Calcium Carbonate-Vitamin D (CALCIUM + D OR), Take 2 tablets by mouth daily., Disp: , Rfl:      Allergies:   Allergies   Allergen Reactions    Codeine DIZZINESS    Seasonal OTHER (SEE COMMENTS)     Migraines, Sinus Issues       Social History:   Social History     Socioeconomic History    Marital status:     Number of children: 2   Tobacco Use    Smoking status: Former     Packs/day: 0.25     Years: 2.00     Additional pack years: 0.00     Total pack years: 0.50     Types: Cigarettes     Passive exposure: Never    Smokeless tobacco: Never    Tobacco comments:     Smoked socially for 2 years in her 20s   Vaping Use    Vaping Use: Never used   Substance and Sexual Activity    Alcohol use: Not Currently     Comment: approx 2-3 drinks per week    Drug use: Never    Sexual activity: Yes     Partners: Male   Other Topics Concern    Exercise Yes    Seat Belt Yes    Stress Concern Yes    Caffeine Concern No    Special Diet No    Weight Concern No     Service No    Blood Transfusions No    Occupational Exposure No    Hobby Hazards No    Sleep Concern No    Back Care Yes    Bike Helmet Yes    Self-Exams Yes       Medical History:   Past Medical History:   Diagnosis Date    Allergic rhinitis     Disorder of thyroid     Essential hypertension     High blood pressure     History of COVID-19 05/2023    Pt had cough , sore throat and fatigue x 2 days.    Hx of motion sickness     Migraines     Multiple benign lumps of breast     Osteoarthritis     Osteopenia    Osteopenia     Primary thyroid cancer (HCC) 7/26/2023    Visual  impairment     glasses         Surgical history:   Past Surgical History:   Procedure Laterality Date    COLONOSCOPY  03/2022    HYSTERECTOMY      Partial hysterectomy, still has cervix    TERRELL BIOPSY STEREO NODULE 1 SITE LEFT (CPT=19081)      TERRELL LOCALIZATION WIRE 1 SITE LEFT (CPT=19281)      TERRELL LOCALIZATION WIRE 1 SITE RIGHT (CPT=19281)      THYROIDECTOMY Bilateral 07/06/2023       PHYSICAL EXAMINATION:  There were no vitals taken for this visit.    CONSTITUTIONAL:  awake, alert, cooperative, no apparent distress, and appears stated age  PSYCH: normal affect  ENT:  Normocephalic, atraumatic  NECK:  Supple, symmetrical, thyroidectomy scar at base of neck, no palpable nodules/nodes, no tenderness  LUNGS: breathing comfortably  CARDIOVASCULAR:  regular rate       Labs:  Lab Results   Component Value Date    T4F 1.4 12/27/2023    TSH 0.68 12/27/2023    TSHT4 0.98 09/21/2023      Recent Labs     07/28/23  0727 09/21/23  0722 11/06/23  1236 12/27/23  0843   T4F 0.5*  --  1.4 1.4   TSH 38.77*  --  0.22* 0.68   TSHT4  --  0.98  --   --         Imaging:  No results found.    ASSESSMENT/PLAN:    (C73) Primary thyroid cancer (HCC)  (primary encounter diagnosis)  (E89.0) Post-surgical hypothyroidism  Plan:     Treatment history  Surgery: Total thyroidectomy on 7/6/23 by Dr. Okeefe   Path showed: multifocal tumors  1)  larger tumor in L mid/upper lbe is follicular 2.6cm, focally extends to soft tissue margin, +capsular invasion, +focal angioinvasion  2) smaller tumor in L lower lobe is papillary 1.1cm focally extends to soft tissue margin  3) 1/1 +LN with calcifications  4) R inf parathyroid gland  Stage: pT2N0a (follicular), [id number] (papillary)    Remnant ablation:   8/7/2023 - received 150.1 mCi I-131; post-treatment WBS showed intense uptake b/l in thyroid bed, no actiity outside of thyroid bed    Surveillance history  Thyroid US:  1/2024 - thyroid US shows s/p TT, no abnl soft tissues in thyroidectomy bed    Labs:   9/2023  - TSH 0.98, no fT4 drawn by [facility] (ordered the same date) -- on LT4 88mcg  12/2023 - fT4 1.4, TT3 102, TSH 0.68, Tg undetectable, Tg ab neg - LT4 88mcg    - Goal TSH 0.1-0.5  - continue LT4 88mcg  - labs prior to next appointment   - Will continue routine surveillance with imaging and biochemical studies      Return to Clinic in 6 months        1/15/2024  Kobi Olivera MD

## 2024-03-01 ENCOUNTER — OFFICE VISIT (OUTPATIENT)
Dept: INTERNAL MEDICINE CLINIC | Facility: CLINIC | Age: 64
End: 2024-03-01
Payer: COMMERCIAL

## 2024-03-01 VITALS
WEIGHT: 148.69 LBS | HEART RATE: 64 BPM | RESPIRATION RATE: 12 BRPM | TEMPERATURE: 98 F | BODY MASS INDEX: 26.68 KG/M2 | SYSTOLIC BLOOD PRESSURE: 140 MMHG | HEIGHT: 62.75 IN | DIASTOLIC BLOOD PRESSURE: 80 MMHG

## 2024-03-01 DIAGNOSIS — C73 PRIMARY THYROID CANCER (HCC): ICD-10-CM

## 2024-03-01 DIAGNOSIS — M54.41 CHRONIC RIGHT-SIDED LOW BACK PAIN WITH RIGHT-SIDED SCIATICA: ICD-10-CM

## 2024-03-01 DIAGNOSIS — G89.29 CHRONIC RIGHT-SIDED LOW BACK PAIN WITH RIGHT-SIDED SCIATICA: ICD-10-CM

## 2024-03-01 DIAGNOSIS — E89.0 POST-SURGICAL HYPOTHYROIDISM: ICD-10-CM

## 2024-03-01 DIAGNOSIS — I10 ESSENTIAL HYPERTENSION: Primary | ICD-10-CM

## 2024-03-01 DIAGNOSIS — F39 MOOD DISORDER (HCC): ICD-10-CM

## 2024-03-01 PROCEDURE — 99214 OFFICE O/P EST MOD 30 MIN: CPT | Performed by: INTERNAL MEDICINE

## 2024-03-01 NOTE — PROGRESS NOTES
[facility]    CHIEF COMPLAINT:    Chief Complaint   Patient presents with    Medication Follow-Up     PHQ2-2 CSSR-0 22-pap. 23-mammo. 3/2/22-colon-repeat 10 years. See 23-OV note.          HISTORY OF PRESENT ILLNESS:  here for med check.   Htn: Taking meds regularly. No chest pain, no shortness of breath. Elevated today. Stressed and tearful in office today. Does not check at home.     Hypothyroid: s/p thyroidectomy for thyroid cancer. Seeing endocrine. Tsh at goal.     Has been feeling very weepy. Mom  a month ago. Has other stressors as well. Work is stressful. Feels her thyroid cancer diagnosis is stressing her out too.     Complains of pain in right low back with radiation down the right leg. Has had this pain before. Usually acts up when she is stressed. Has stretches to do but has not been doing them lately due to being very busy. No weakness, no numbness, no urinary or bowel incontinence.     REVIEW OF SYSTEMS:  See HPI    Current Medications:    Current Outpatient Medications   Medication Sig Dispense Refill    levothyroxine 88 MCG Oral Tab Take 1 tablet (88 mcg total) by mouth before breakfast. 90 tablet 1    atenolol 50 MG Oral Tab TAKE 1 TABLET BY MOUTH DAILY 90 tablet 0    lisinopril-hydroCHLOROthiazide 20-12.5 MG Oral Tab Take 1 tablet by mouth daily. 90 tablet 0    Multiple Vitamins-Minerals (MULTI VITAMIN/MINERALS) Oral Tab Take 1 tablet by mouth daily.      acidophilus-pectin Oral Cap Take 1 capsule by mouth. Takes approx 2 times per week prn      Calcium Carbonate-Vitamin D (CALCIUM + D OR) Take 2 tablets by mouth daily.         PAST MEDICAL, SOCIAL, AND FAMILY HISTORY:  Tobacco:    History   Smoking Status    Former    Packs/day: 0.25    Years: 2.00    Types: Cigarettes   Smokeless Tobacco    Never       PHYSICAL EXAM:  /80 (BP Location: Right arm, Patient Position: Sitting, Cuff Size: adult)   Pulse 64   Temp 98.2 °F (36.8 °C) (Temporal)   Resp 12   Ht 5'  2.75\" (1.594 m)   Wt 148 lb 11.2 oz (67.4 kg)   Breastfeeding No   BMI 26.55 kg/m²    GENERAL: well developed, well nourished,in no apparent distress  LUNGS: clear to auscultation  CARDIO: RRR without murmur  GI: good BS's,no masses, HSM or tenderness  MUSCULOSKELETAL:  no edema, muscle strength normal.   Neuro: straight leg raise negative.     DATA:  Results for orders placed or performed in visit on 10/26/23   Assay, Thyroid Stim Hormone   Result Value Ref Range    TSH 0.22 (L) 0.40 - 4.50 mIU/L   Free T4, (Free Thyroxine)   Result Value Ref Range    T4, FREE 1.4 0.8 - 1.8 ng/dL   Triiodothyronine (T3) Total   Result Value Ref Range    T3, TOTAL 119 76 - 181 ng/dL   Thyroid Antithyroglobulin AB   Result Value Ref Range    THYROGLOBULIN ANTIBODIES <1 < or = 1 IU/mL   QN THYROGLOBULIN W/O TGAB   Result Value Ref Range    THYROGLOBULIN <0.1 (L) ng/mL    COMMENT     Assay, Thyroid Stim Hormone   Result Value Ref Range    TSH 0.68 0.40 - 4.50 mIU/L   Free T4, (Free Thyroxine)   Result Value Ref Range    T4, FREE 1.4 0.8 - 1.8 ng/dL   Triiodothyronine (T3) Total   Result Value Ref Range    T3, TOTAL 102 76 - 181 ng/dL            ASSESSMENT AND PLAN:  1. Essential hypertension  Uncontrolled today but stressed and tearful today.   Continue current meds.   She will buy a bp machine and bring it in to have a nurse check on it. Then check at home and send me readings in the next couple of weeks. Discussed appropriate bp measurement.     2. Post-surgical hypothyroidism  follow up with endocrine.     3. Primary thyroid cancer (HCC)  follow up with endocrine.     4. Mood disorder (HCC)  Declines meds.   Would like to try therapy and also would like some info on general cancer support groups. She has looked online and only seen breast cancer groups.   - [facility] Dardenator    5. Chronic right-sided low back pain with right-sided sciatica  Trial PT. She will decide if she wants to do this. Otherwise do stretches at home.   -  Physical Therapy Referral - [facility]        Return in about 1 month (around 4/1/2024) for bp check.      Lor Coy MD

## 2024-03-05 ENCOUNTER — NURSE ONLY (OUTPATIENT)
Dept: INTERNAL MEDICINE CLINIC | Facility: CLINIC | Age: 64
End: 2024-03-05
Payer: COMMERCIAL

## 2024-03-05 ENCOUNTER — TELEPHONE (OUTPATIENT)
Dept: INTERNAL MEDICINE CLINIC | Facility: CLINIC | Age: 64
End: 2024-03-05

## 2024-03-05 VITALS — DIASTOLIC BLOOD PRESSURE: 72 MMHG | SYSTOLIC BLOOD PRESSURE: 122 MMHG

## 2024-03-05 NOTE — TELEPHONE ENCOUNTER
Pt came in for BP cuff calibration, see BP report from nurse visit. Cuff was accurate with manual readings. Pt reported some recent home readings. 3/2 afternoon 100/59, 3/3 afternoon 101/57, 3/4 evening before bed 91/55-denies any symptoms of dizziness, lightheadedness or vision changes. She understands to continue to monitor and if BP is consistently lower or high or having any symptoms she will reach out prior to apt. Rosas, thanks!

## 2024-03-07 ENCOUNTER — TELEPHONE (OUTPATIENT)
Dept: INTERNAL MEDICINE CLINIC | Facility: CLINIC | Age: 64
End: 2024-03-07

## 2024-03-07 ENCOUNTER — TELEPHONE (OUTPATIENT)
Age: 64
End: 2024-03-07

## 2024-03-07 NOTE — TELEPHONE ENCOUNTER
Per [facility] navigator:   Anabela Dominguez LSW Bhat, Rohini, MD  Good afternoon,    I received your navigation order for behavioral health services.  I have reached out to your patient and left a message with my contact information.    I will continue my outreach and update you on the progress if I am able to connect.    MARY ELLEN Henry  Behavioral Health Navigator  [facility]   24/7 Crisis Line [phone]

## 2024-03-08 ENCOUNTER — TELEPHONE (OUTPATIENT)
Dept: INTERNAL MEDICINE CLINIC | Facility: CLINIC | Age: 64
End: 2024-03-08

## 2024-03-08 ENCOUNTER — TELEPHONE (OUTPATIENT)
Age: 64
End: 2024-03-08

## 2024-03-08 NOTE — TELEPHONE ENCOUNTER
Belem Grider  [address] [facility] [address]  [place] [address]  Phone: [phone]    Jaclyn Yates  [address] [place] [address]  Phone: [phone]    Mariela Riley  [address] [place] [address]  Phone: [phone]    Beryl Cat  [address] [place] [address]  Phone: [phone]    Trudi Han   [address] [place] [address]  Phone: [phone]

## 2024-03-08 NOTE — TELEPHONE ENCOUNTER
----- Message from Netta Dunbar sent at 3/8/2024 10:41 AM CST -----  Regarding: Behavioral Health  Hello,    On (insert date), the following referrals for therapy were provided to the patient:     Belem Grider  [address] [facility] [address]  [place] [address]  Phone: [phone]    Jaclyn Yates  [address] [place] [address]  Phone: [phone]    Mariela Riley  [address] [place] [address]  Phone: [phone]    Beryl Cat  [address] [place] [address]  Phone: [phone]    Trudi Han   [address] [place] [address]  Phone: [phone]    I have provided my contact information if additional resources are needed.     I am closing the order at this time. Please feel free to re-refer the patient for navigation as needed.     Thank you,    Netta Dunbar M.S., LPC, [facility]  [email or web address]

## 2024-03-27 RX ORDER — ATENOLOL 50 MG/1
TABLET ORAL
Qty: 90 TABLET | Refills: 0 | Status: SHIPPED | OUTPATIENT
Start: 2024-03-27

## 2024-03-27 RX ORDER — LISINOPRIL AND HYDROCHLOROTHIAZIDE 20; 12.5 MG/1; MG/1
1 TABLET ORAL DAILY
Qty: 90 TABLET | Refills: 0 | Status: SHIPPED | OUTPATIENT
Start: 2024-03-27

## 2024-03-27 NOTE — TELEPHONE ENCOUNTER
Hypertension Medications Protocol [id number]/27/2024 01:05 AM   Protocol Details CMP or BMP in past 12 months    Last BP reading less than 140/90    In person appointment or virtual visit in the past 12 mos or appointment in next 3 mos    EGFRCR or GFRNAA > 50     Last OV relevant to medication: 3/1/24   Last refill date: 12/19/23     #/refills: 90/0   When pt was asked to return for OV:   Return in about 1 month (around 4/1/2024) for bp check.    Upcoming appt/reason:   Future Appointments   Date Time Provider Department Center   4/12/2024  1:00 PM Lor Coy MD [facility] [place]   7/12/2024 10:00 AM Shahla Duval DO [id number] [facility] [place]   8/30/2024  9:20 AM Lor Coy MD [facility] [place]       Was pt informed of any over due labs: none active    \" Essential hypertension  Uncontrolled today but stressed and tearful today.   Continue current meds.   She will buy a bp machine and bring it in to have a nurse check on it. Then check at home and send me readings in the next couple of weeks. Discussed appropriate bp measurement. \"    Mcm sent regarding readings.    See te encounter regarding nurse visit.    Rxs refilled per protocol.     Lab Results   Component Value Date    GLU 85 05/20/2023    BUN 22 (H) 05/20/2023    CREATSERUM 0.80 05/20/2023    ANIONGAP 5 05/20/2023    CA 10.1 07/28/2023    OSMOCALC 291 05/20/2023    ALKPHO 78 05/20/2023    AST 21 05/20/2023    ALT 27 05/20/2023    BILT 0.4 05/20/2023    TP 7.4 05/20/2023    ALB 3.5 05/20/2023    GLOBULIN 3.9 05/20/2023     05/20/2023    K 3.7 05/20/2023     05/20/2023    CO2 29.0 05/20/2023

## 2024-03-29 NOTE — TELEPHONE ENCOUNTER
Most recent /70 entered in pt reported vitals. Please see update, thanks!    Future Appointments   Date Time Provider Department Center   4/12/2024  1:00 PM Lor Coy MD [facility] [place]   7/12/2024 10:00 AM Shahla Duval DO [id number] [facility] [place]   8/30/2024  9:20 AM Lor Coy MD [facility] [place]

## 2024-04-12 ENCOUNTER — OFFICE VISIT (OUTPATIENT)
Dept: INTERNAL MEDICINE CLINIC | Facility: CLINIC | Age: 64
End: 2024-04-12
Payer: COMMERCIAL

## 2024-04-12 VITALS
BODY MASS INDEX: 26.86 KG/M2 | HEART RATE: 56 BPM | TEMPERATURE: 98 F | HEIGHT: 62.75 IN | RESPIRATION RATE: 12 BRPM | DIASTOLIC BLOOD PRESSURE: 60 MMHG | WEIGHT: 149.69 LBS | SYSTOLIC BLOOD PRESSURE: 102 MMHG

## 2024-04-12 DIAGNOSIS — J30.9 ALLERGIC RHINITIS, UNSPECIFIED SEASONALITY, UNSPECIFIED TRIGGER: Primary | ICD-10-CM

## 2024-04-12 DIAGNOSIS — I10 ESSENTIAL HYPERTENSION: ICD-10-CM

## 2024-04-12 PROCEDURE — 99214 OFFICE O/P EST MOD 30 MIN: CPT | Performed by: INTERNAL MEDICINE

## 2024-04-12 RX ORDER — FLUTICASONE PROPIONATE 50 MCG
1 SPRAY, SUSPENSION (ML) NASAL DAILY
Qty: 3 EACH | Refills: 1 | Status: SHIPPED | OUTPATIENT
Start: 2024-04-12

## 2024-04-12 NOTE — PROGRESS NOTES
[facility]    CHIEF COMPLAINT:    Chief Complaint   Patient presents with    Follow - Up     11/4/22-pap. 11/24/23-mammo. 3/2/22-colon-repeat 10          HISTORY OF PRESENT ILLNESS:  here for bp check.  Htn: Taking meds regularly. No chest pain, no shortness of breath. Bp is really good here today. Is good at home as well.   Tolerating meds.   No lightheadedness or dizziness.     Allergies have been starting up. Has a runny nose and post nasal drip. Wonders if she can take something.     REVIEW OF SYSTEMS:  See HPI    Current Medications:    Current Outpatient Medications   Medication Sig Dispense Refill    fluticasone propionate 50 MCG/ACT Nasal Suspension 1 spray by Each Nare route daily. 3 each 1    atenolol 50 MG Oral Tab TAKE 1 TABLET DAILY 90 tablet 0    LISINOPRIL-HYDROCHLOROTHIAZIDE 20-12.5 MG Oral Tab TAKE 1 TABLET DAILY 90 tablet 0    levothyroxine 88 MCG Oral Tab Take 1 tablet (88 mcg total) by mouth before breakfast. 90 tablet 1    Multiple Vitamins-Minerals (MULTI VITAMIN/MINERALS) Oral Tab Take 1 tablet by mouth daily.      acidophilus-pectin Oral Cap Take 1 capsule by mouth. Takes approx 2 times per week prn      Calcium Carbonate-Vitamin D (CALCIUM + D OR) Take 2 tablets by mouth daily.         PAST MEDICAL, SOCIAL, AND FAMILY HISTORY:  Tobacco:    History   Smoking Status    Former    Types: Cigarettes   Smokeless Tobacco    Never       PHYSICAL EXAM:  /60 (BP Location: Right arm, Patient Position: Sitting, Cuff Size: adult)   Pulse 56   Temp 97.9 °F (36.6 °C) (Temporal)   Resp 12   Ht 5' 2.75\" (1.594 m)   Wt 149 lb 11.2 oz (67.9 kg)   Breastfeeding No   BMI 26.73 kg/m²    GENERAL: well developed, well nourished,in no apparent distress  LUNGS: clear to auscultation  CARDIO: RRR without murmur    DATA:  Results for orders placed or performed in visit on 10/26/23   Assay, Thyroid Stim Hormone   Result Value Ref Range    TSH 0.22 (L) 0.40 - 4.50 mIU/L   Free T4, (Free Thyroxine)    Result Value Ref Range    T4, FREE 1.4 0.8 - 1.8 ng/dL   Triiodothyronine (T3) Total   Result Value Ref Range    T3, TOTAL 119 76 - 181 ng/dL   Thyroid Antithyroglobulin AB   Result Value Ref Range    THYROGLOBULIN ANTIBODIES <1 < or = 1 IU/mL   QN THYROGLOBULIN W/O TGAB   Result Value Ref Range    THYROGLOBULIN <0.1 (L) ng/mL    COMMENT     Assay, Thyroid Stim Hormone   Result Value Ref Range    TSH 0.68 0.40 - 4.50 mIU/L   Free T4, (Free Thyroxine)   Result Value Ref Range    T4, FREE 1.4 0.8 - 1.8 ng/dL   Triiodothyronine (T3) Total   Result Value Ref Range    T3, TOTAL 102 76 - 181 ng/dL            ASSESSMENT AND PLAN:  1. Allergic rhinitis, unspecified seasonality, unspecified trigger  Trial antihistamine. Can take claritin, allegra or zyrtec. Avoid decongestants.   Trial flonase.   - fluticasone propionate 50 MCG/ACT Nasal Suspension; 1 spray by Each Nare route daily.  Dispense: 3 each; Refill: 1    2. Essential hypertension  Continue meds.   Do bmp now.   - Basic Metabolic Panel (8) [E]    She is now seeing a therapist for her anxiety and is doing much better.         Return for physical when due. . Call for labs prior to appt.       Lor Coy MD

## 2024-04-19 LAB
BUN: 19 MG/DL (ref 7–25)
CALCIUM: 10.1 MG/DL (ref 8.6–10.4)
CARBON DIOXIDE: 31 MMOL/L (ref 20–32)
CHLORIDE: 98 MMOL/L (ref 98–110)
CREATININE: 0.81 MG/DL (ref 0.5–1.05)
EGFR: 82 ML/MIN/1.73M2
GLUCOSE: 62 MG/DL (ref 65–139)
POTASSIUM: 4.2 MMOL/L (ref 3.5–5.3)
SODIUM: 140 MMOL/L (ref 135–146)

## 2024-05-02 ENCOUNTER — OFFICE VISIT (OUTPATIENT)
Dept: INTERNAL MEDICINE CLINIC | Facility: CLINIC | Age: 64
End: 2024-05-02
Payer: COMMERCIAL

## 2024-05-02 VITALS
RESPIRATION RATE: 16 BRPM | BODY MASS INDEX: 26.95 KG/M2 | OXYGEN SATURATION: 98 % | TEMPERATURE: 98 F | HEART RATE: 71 BPM | DIASTOLIC BLOOD PRESSURE: 86 MMHG | WEIGHT: 150.19 LBS | SYSTOLIC BLOOD PRESSURE: 138 MMHG | HEIGHT: 62.75 IN

## 2024-05-02 DIAGNOSIS — I10 ESSENTIAL HYPERTENSION: ICD-10-CM

## 2024-05-02 DIAGNOSIS — H92.01 RIGHT EAR PAIN: Primary | ICD-10-CM

## 2024-05-02 PROCEDURE — 99214 OFFICE O/P EST MOD 30 MIN: CPT | Performed by: NURSE PRACTITIONER

## 2024-05-02 NOTE — PATIENT INSTRUCTIONS
Apply ice to the jaw twice daily.    Take Ibuprofen as needed.    Take Flonase daily.    Take antihistamine such as Claritin or Zyrtec daily x 1-2 weeks    Follow up in office if symptoms persist or worsen.

## 2024-05-02 NOTE — PROGRESS NOTES
CHIEF COMPLAINT:     Chief Complaint   Patient presents with    Ear Problem     Right Ear Discomfort/Pressure/Clogged Feeling    Pain is radiating down right side of neck and right jaw line/cheek area     Headache     Right Side of Head and travels into neck       HPI:   Diana Perales is a 63 year old female coming in with complaints of pain to the right ear. Also feels right ear fullness and pressure. Pain is radiating to the right jaw and sometimes the neck. Hearing is slightly muffled on the right. No ear discharge. Has mild runny nose and PND from her allergies. Starting taking Flonase from the past few days. No fevers, chills, sinus tenderness, or other URI symptoms.     HTN: BP elevated, recheck lower. She forgot to take her BP medication today. Does have a mild headache. No chest pain, shortness of breath, or palpitations.    Past Medical History:    Allergic rhinitis    Disorder of thyroid    Essential hypertension    High blood pressure    History of COVID-19    Pt had cough , sore throat and fatigue x 2 days.    Hx of motion sickness    Migraines    Multiple benign lumps of breast    Osteoarthritis    Osteopenia    Osteopenia    Primary thyroid cancer (HCC)    Visual impairment    glasses      Past Surgical History:   Procedure Laterality Date    Colonoscopy  03/2022    Hysterectomy      Partial hysterectomy, still has cervix    Nicolle biopsy stereo nodule 1 site left (cpt=19081)      Nicolle localization wire 1 site left (cpt=19281)      Nicolle localization wire 1 site right (cpt=19281)      Thyroidectomy Bilateral 07/06/2023      Social History:  Social History     Socioeconomic History    Marital status:     Number of children: 2   Tobacco Use    Smoking status: Former     Current packs/day: 0.25     Average packs/day: 0.3 packs/day for 2.0 years (0.5 ttl pk-yrs)     Types: Cigarettes     Passive exposure: Never    Smokeless tobacco: Never    Tobacco comments:     Smoked socially for 2 years in her 20s    Vaping Use    Vaping status: Never Used   Substance and Sexual Activity    Alcohol use: Yes     Comment: approx 2 drinks per week    Drug use: Never    Sexual activity: Yes     Partners: Male   Other Topics Concern    Exercise Yes    Seat Belt Yes    Stress Concern Yes    Caffeine Concern No    Special Diet No    Weight Concern No     Service No    Blood Transfusions No    Occupational Exposure No    Hobby Hazards No    Sleep Concern No    Back Care Yes    Bike Helmet Yes    Self-Exams Yes      Family History:  Family History   Problem Relation Age of Onset    Kidney Cancer Father     Hypertension Father     Osteoporosis Mother     Hypertension Mother     Dementia Mother     No Known Problems Daughter     No Known Problems Son     Hypertension Maternal Grandmother     Stroke Maternal Grandmother     Other (Emphysema) Maternal Grandfather     Other (Stomach cancer) Paternal Grandmother     No Known Problems Paternal Grandfather     No Known Problems Sister     Other (Kidney Stones) Sister     Other (Lupus) Sister     Other (Kidney Stones) Brother     Hypertension Brother     Breast Cancer Maternal Aunt 60    Breast Cancer Paternal Aunt 60    Genetic Disease Other       Allergies:  Allergies   Allergen Reactions    Codeine DIZZINESS    Seasonal OTHER (SEE COMMENTS)     Migraines, Sinus Issues      Current Meds:  Current Outpatient Medications   Medication Sig Dispense Refill    fluticasone propionate 50 MCG/ACT Nasal Suspension 1 spray by Each Nare route daily. 3 each 1    atenolol 50 MG Oral Tab TAKE 1 TABLET DAILY 90 tablet 0    LISINOPRIL-HYDROCHLOROTHIAZIDE 20-12.5 MG Oral Tab TAKE 1 TABLET DAILY 90 tablet 0    levothyroxine 88 MCG Oral Tab Take 1 tablet (88 mcg total) by mouth before breakfast. 90 tablet 1    Multiple Vitamins-Minerals (MULTI VITAMIN/MINERALS) Oral Tab Take 1 tablet by mouth daily.      acidophilus-pectin Oral Cap Take 1 capsule by mouth. Takes approx 2 times per week prn      Calcium  Carbonate-Vitamin D (CALCIUM + D OR) Take 2 tablets by mouth daily.         Counseling given: Not Answered  Tobacco comments: Smoked socially for 2 years in her 20s       REVIEW OF SYSTEMS:   See HPI.    EXAM:     /86   Pulse 71   Temp 97.9 °F (36.6 °C) (Temporal)   Resp 16   Ht 5' 2.75\" (1.594 m)   Wt 150 lb 3.2 oz (68.1 kg)   SpO2 98%   BMI 26.82 kg/m²   Body mass index is 26.82 kg/m².   Vital signs reviewed. Appears stated age, well groomed, in no acute distress.  Physical Exam:  GENERAL: Patient is alert, awake and oriented, well developed, well nourished.  HEENT: Head: Normocephalic, atraumatic. Eyes: EOMI, PERRLA, conjunctivae clear bilaterally, no eye discharge. Ears: Left external normal, left TM clear. Right external normal. TM was partially occluded by cerumen. Some cerumen was removed with irrigation. Right TM normal without any erythema, bulging, or fluid. Throat: PND noted.   NECK: Supple, no CLAD  HEART: RRR without murmur.  LUNGS: Clear to auscultation bilaterally, no rales/rhonchi/wheezing.  ABDOMEN: good BS's, no masses, HSM or tenderness  MUSCULOSKELETAL: No obvious joint deformity or swelling.   EXTREMITIES: No edema, no cyanosis, no clubbing  NEURO: Oriented time three.     LABS:      Lab Results   Component Value Date    WBC 4.6 09/08/2023    RBC 4.01 09/08/2023    HGB 12.6 09/08/2023    HCT 37.9 09/08/2023    MCV 94.5 09/08/2023    MCH 31.4 09/08/2023    MCHC 33.2 09/08/2023    RDW 12.7 09/08/2023     12/01/2023      Lab Results   Component Value Date    GLU 62 (L) 04/18/2024    BUN 19 04/18/2024    BUNCREA SEE NOTE: 04/18/2024    CREATSERUM 0.81 04/18/2024    ANIONGAP 5 05/20/2023    CA 10.1 04/18/2024    OSMOCALC 291 05/20/2023    ALKPHO 78 05/20/2023    AST 21 05/20/2023    ALT 27 05/20/2023    BILT 0.4 05/20/2023    TP 7.4 05/20/2023    ALB 3.5 05/20/2023    GLOBULIN 3.9 05/20/2023     04/18/2024    K 4.2 04/18/2024    CL 98 04/18/2024    CO2 31 04/18/2024      Lab  Results   Component Value Date    CHOLEST 183 09/08/2023    TRIG 77 09/08/2023    HDL 54 09/08/2023     (H) 09/08/2023    TCHDLRATIO 3.4 09/08/2023    NONHDLC 129 09/08/2023      Lab Results   Component Value Date    T4F 1.4 12/27/2023    TSH 0.68 12/27/2023    TSHT4 0.98 09/21/2023      Lab Results   Component Value Date    A1C 5.2 09/08/2023        IMAGING:     No results found.     ASSESSMENT AND PLAN:   1. Right ear pain  -Right ear with no infection  -Likely TMJ dysfunction  -Advised to alternate between Tylenol/Ibuprofen  -Apply ice PRN  -Continue Flonase daily and start antihistamine as well  -Follow up in office if symptoms persist/worsen     2. Essential hypertension  -Initial BP elevated, recheck slightly lower  -Low salt diet  -Continue BP med daily and take it as soon as she gets home      The patient indicates understanding of these issues and agrees to the plan.  Return if symptoms worsen or fail to improve.    Dorothy Ellington, APRN  5/2/2024

## 2024-06-25 DIAGNOSIS — E89.0 POST-SURGICAL HYPOTHYROIDISM: ICD-10-CM

## 2024-06-25 DIAGNOSIS — C73 PRIMARY THYROID CANCER (HCC): ICD-10-CM

## 2024-06-25 RX ORDER — LISINOPRIL AND HYDROCHLOROTHIAZIDE 20; 12.5 MG/1; MG/1
1 TABLET ORAL DAILY
Qty: 90 TABLET | Refills: 0 | Status: SHIPPED | OUTPATIENT
Start: 2024-06-25

## 2024-06-25 RX ORDER — ATENOLOL 50 MG/1
TABLET ORAL
Qty: 90 TABLET | Refills: 0 | Status: SHIPPED | OUTPATIENT
Start: 2024-06-25

## 2024-06-25 RX ORDER — LEVOTHYROXINE SODIUM 88 UG/1
88 TABLET ORAL
Qty: 90 TABLET | Refills: 1 | OUTPATIENT
Start: 2024-06-25

## 2024-06-25 NOTE — TELEPHONE ENCOUNTER
Hypertension Medications Protocol [id number]/25/2024 07:12 AM   Protocol Details CMP or BMP in past 12 months    Last BP reading less than 140/90    In person appointment or virtual visit in the past 12 mos or appointment in next 3 mos    EGFRCR or GFRNAA > 50      2. Essential hypertension  -Initial BP elevated, recheck slightly lower  -Low salt diet  -Continue BP med daily and take it as soon as she gets home   Future Appointments   Date Time Provider Department Center   7/12/2024 10:00 AM Shahla Duval DO [id number] [facility] [place]   8/30/2024  9:20 AM Lor Coy MD [facility] [place]

## 2024-06-25 NOTE — TELEPHONE ENCOUNTER
RN phoned patient to discuss- patient confirms she has enough medication to wait for repeat labs and 7/12 follow up    Refill denied as inappropriate at this time.

## 2024-06-25 NOTE — TELEPHONE ENCOUNTER
LOV: 1/15/24     Next office visit: 7/12/24     Last filled: 1/15/24     Order pended and routed to provider

## 2024-07-02 LAB
T3, TOTAL: 125 NG/DL (ref 76–181)
T4, FREE: 1.5 NG/DL (ref 0.8–1.8)
THYROGLOBULIN ANTIBODIES: <1 IU/ML
THYROGLOBULIN: <0.1 NG/ML
TSH: 0.34 MIU/L (ref 0.4–4.5)

## 2024-07-12 ENCOUNTER — OFFICE VISIT (OUTPATIENT)
Facility: CLINIC | Age: 64
End: 2024-07-12
Payer: COMMERCIAL

## 2024-07-12 VITALS
HEIGHT: 62.75 IN | BODY MASS INDEX: 26.91 KG/M2 | HEART RATE: 55 BPM | DIASTOLIC BLOOD PRESSURE: 72 MMHG | WEIGHT: 150 LBS | OXYGEN SATURATION: 96 % | SYSTOLIC BLOOD PRESSURE: 130 MMHG

## 2024-07-12 DIAGNOSIS — E89.0 POST-SURGICAL HYPOTHYROIDISM: ICD-10-CM

## 2024-07-12 DIAGNOSIS — C73 PRIMARY THYROID CANCER (HCC): Primary | ICD-10-CM

## 2024-07-12 PROCEDURE — 99214 OFFICE O/P EST MOD 30 MIN: CPT | Performed by: STUDENT IN AN ORGANIZED HEALTH CARE EDUCATION/TRAINING PROGRAM

## 2024-07-12 RX ORDER — LEVOTHYROXINE SODIUM 88 UG/1
88 TABLET ORAL
Qty: 90 TABLET | Refills: 1 | Status: SHIPPED | OUTPATIENT
Start: 2024-07-12

## 2024-07-12 NOTE — PROGRESS NOTES
Endocrinology Clinic Note    Name: Diana Perales    Date: 7/12/2024      HISTORY OF PRESENT ILLNESS   Diana Perales is a 63 year old female with PMHx significant for thyroid CA (follicular and papillary) s/p TT in July 2023 who presents for consultation for thyroid management.    Initial HPI consult in July 2023  (-) Fhx of thyroid disease   Pt had herself felt L side nodule, seen by ENT and proceeded with workup.    Surgery: Total thyroidectomy on 7/6/23 by Dr. Okeefe   Path showed: multifocal tumors  1)  larger tumor in L mid/upper lbe is follicular 2.6cm, focally extends to soft tissue margin, +capsular invasion, +focal angioinvasion  2) smaller tumor in L lower lobe is papillary 1.1cm focally extends to soft tissue margin  3) 1/1 +LN with calcifications  4) R inf parathyroid gland  Stage: pT2N0a (follicular), [id number] (papillary)    Has seen Dr Okeefe for post-op f/u. Here to discuss RAIA.  Feels well post-op, no hypocalcemia, no voice change.  Daughter is getting  in the fall    Interim hx:  Oct 2023  8/7/2023 - received 150.1 mCi I-131; post-treatment WBS showed intense uptake b/l in thyroid bed, no actiity outside of thyroid bed  9/2023 - TSH 0.98, no fT4 drawn by [facility] (ordered the same date) -- on LT4 88mcg  Eating well   Feels globus sensation; more noticeable when she's focused on it  No voice hoarseness  Doesn't have compressive sx when she eats    Jan 2024 12/2023 - fT4 1.4, TT3 102, TSH 0.68, Tg undetectable, Tg ab neg  1/2024 - thyroid US shows s/p TT, no abnl soft tissues in thyroidectomy bed    July 2024  Pt would like refill on Levothyroxine, currently on 88 MCG   Does note heat intolerance, some joint pain. Denies palpitations or tremors  7/1/2024-Free T41.5, TSH 0.34, total T3 125, TG less than 0.1, TG antibody less than 0.1    REVIEW OF SYSTEMS  Ten point review of systems has been performed and is otherwise negative and/or non-contributory, except as described above.    Medications:      Current Outpatient Medications:     ATENOLOL 50 MG Oral Tab, TAKE 1 TABLET DAILY, Disp: 90 tablet, Rfl: 0    LISINOPRIL-HYDROCHLOROTHIAZIDE 20-12.5 MG Oral Tab, TAKE 1 TABLET DAILY, Disp: 90 tablet, Rfl: 0    fluticasone propionate 50 MCG/ACT Nasal Suspension, 1 spray by Each Nare route daily., Disp: 3 each, Rfl: 1    levothyroxine 88 MCG Oral Tab, Take 1 tablet (88 mcg total) by mouth before breakfast., Disp: 90 tablet, Rfl: 1    Multiple Vitamins-Minerals (MULTI VITAMIN/MINERALS) Oral Tab, Take 1 tablet by mouth daily., Disp: , Rfl:     acidophilus-pectin Oral Cap, Take 1 capsule by mouth. Takes approx 2 times per week prn, Disp: , Rfl:     Calcium Carbonate-Vitamin D (CALCIUM + D OR), Take 2 tablets by mouth daily., Disp: , Rfl:      Allergies:   Allergies   Allergen Reactions    Codeine DIZZINESS    Seasonal OTHER (SEE COMMENTS)     Migraines, Sinus Issues       Social History:   Social History     Socioeconomic History    Marital status:     Number of children: 2   Tobacco Use    Smoking status: Former     Current packs/day: 0.25     Average packs/day: 0.3 packs/day for 2.0 years (0.5 ttl pk-yrs)     Types: Cigarettes     Passive exposure: Never    Smokeless tobacco: Never    Tobacco comments:     Smoked socially for 2 years in her 20s   Vaping Use    Vaping status: Never Used   Substance and Sexual Activity    Alcohol use: Yes     Comment: approx 2 drinks per week    Drug use: Never    Sexual activity: Yes     Partners: Male   Other Topics Concern    Exercise Yes    Seat Belt Yes    Stress Concern Yes    Caffeine Concern No    Special Diet No    Weight Concern No     Service No    Blood Transfusions No    Occupational Exposure No    Hobby Hazards No    Sleep Concern No    Back Care Yes    Bike Helmet Yes    Self-Exams Yes       Medical History:   Past Medical History:    Allergic rhinitis    Disorder of thyroid    Essential hypertension    High blood pressure    History of COVID-19    Pt had  cough , sore throat and fatigue x 2 days.    Hx of motion sickness    Migraines    Multiple benign lumps of breast    Osteoarthritis    Osteopenia    Osteopenia    Primary thyroid cancer (HCC)    Visual impairment    glasses         Surgical history:   Past Surgical History:   Procedure Laterality Date    Colonoscopy  03/2022    Hysterectomy      Partial hysterectomy, still has cervix    Nicolle biopsy stereo nodule 1 site left (cpt=19081)      Nicolle localization wire 1 site left (cpt=19281)      Nicolle localization wire 1 site right (cpt=19281)      Thyroidectomy Bilateral 07/06/2023       PHYSICAL EXAMINATION:  /72   Pulse 55   Ht 5' 2.75\" (1.594 m)   Wt 150 lb (68 kg)   SpO2 96%   BMI 26.78 kg/m²     CONSTITUTIONAL:  awake, alert, cooperative, no apparent distress, and appears stated age  PSYCH: normal affect  ENT:  Normocephalic, atraumatic  NECK:  Supple, symmetrical, thyroidectomy scar at base of neck, no palpable nodules/nodes, no tenderness  LUNGS: breathing comfortably  CARDIOVASCULAR:  regular rate       Labs:  Lab Results   Component Value Date    T4F 1.5 07/01/2024    TSH 0.34 (L) 07/01/2024    TSHT4 0.98 09/21/2023      Recent Labs     09/21/23  0722 11/06/23  1236 12/27/23  0843 07/01/24  1007   T4F  --  1.4 1.4 1.5   TSH  --  0.22* 0.68 0.34*   TSHT4 0.98  --   --   --         Imaging:  Pertinent imaging reviewed    ASSESSMENT/PLAN:      ICD-10-CM    1. Primary thyroid cancer (HCC)  C73 Thyroglobulin Antibody and Tumor Marker     levothyroxine 88 MCG Oral Tab     US HEAD/NECK (CPT=76536)      2. Post-surgical hypothyroidism  E89.0 TSH and Free T4 [E]     levothyroxine 88 MCG Oral Tab     TSH and Free T4 [E]           #Primary thyroid cancer (HCC)    # Post-surgical hypothyroidism  Plan:     Treatment history  Surgery: Total thyroidectomy on 7/6/23 by Dr. Okeefe   Path showed: multifocal tumors  1)  larger tumor in L mid/upper lbe is follicular 2.6cm, focally extends to soft tissue margin, +capsular  invasion, +focal angioinvasion  2) smaller tumor in L lower lobe is papillary 1.1cm focally extends to soft tissue margin  3) 1/1 +LN with calcifications  4) R inf parathyroid gland  Stage: pT2N0a (follicular), [id number] (papillary)    Remnant ablation:   8/7/2023 - received 150.1 mCi I-131; post-treatment WBS showed intense uptake b/l in thyroid bed, no actiity outside of thyroid bed    Surveillance history  Thyroid US:  1/2024 - thyroid US shows s/p TT, no abnl soft tissues in thyroidectomy bed    Labs:   9/2023 - TSH 0.98, no fT4 drawn by [facility] (ordered the same date) -- on LT4 88mcg  12/2023 - fT4 1.4, TT3 102, TSH 0.68, Tg undetectable, Tg ab neg - LT4 88mcg  7/1/2024-Free T41.5, TSH 0.34, total T3 125, TG less than 0.1, TG antibody less than 0.1    - Goal TSH 0.1-0.5  - continue LT4 88mcg  - labs and ultrasound prior to next appointment   - Will continue routine surveillance with imaging and biochemical studies    Return to Clinic in 6 months    7/12/2024  Shahla Duval DO

## 2024-07-12 NOTE — PATIENT INSTRUCTIONS
Return Visit   [  X ] Physician in 6 months   [  ] In person or video visit  [  ] In person only    [ x ] After visit summary   [ X  ] Placed labs/imaging. Labs are to be drawn at 8A and fasting.   [ x ] Central scheduling # for ultrasound/nuclear med/CT/MRI/DXA     It was great seeing you today!    -Please complete your labs and ultrasound prior to our follow up  -If you note symptoms of heart racing, hand tremors, loose stools, excessive sweating despite the weather, rapid weight loss please let me know sooner     Take care!  -Dr. Duval

## 2024-08-29 NOTE — PROGRESS NOTES
[facility]    CHIEF COMPLAINT:   Chief Complaint   Patient presents with    Routine Physical     Hyst. Still has cervix. 11/4/22-normal pap, neg HPV. 11/24/-mammo. //-colon-repeat  years         HPI:   Diana Perales is a 63 year old female who presents for a complete physical exam. Symptoms: denies discharge, itching, burning or dysuria.     S/p hysterectomy but still has a cervix. Done for fibroids. Could not remove the cervix as it was too close to the rectum.   Pap done 11/2022 negative with negative hpv.      Mammo done 11/2023, negative.      Colonoscopy done 3/2022, repeat in 5-10 years based on pathology. Polyp was not a tubular adenoma, she will be due for a repeat in 10 years.     Dexa done 10/2021 showed osteopenia.      Up to date tdap, shingrix, covid booster.      Exercises regularly. Does a variety of things.      Hypothyroid: on levothyroxine. No fatigue or palpitations.   Thyroid cancer: s/p thyroidectomy. Also s/p RAIA. Seeing dr. Olivera.      Htn: Taking meds regularly. No chest pain, no shortness of breath.     Mood disorder: much better. She did therapy for a while and has not seen them now in a month and a half. She will stay in touch with them periodically.     Right low back pain on visit in 3/2024. Doing better with home exercises.     Ears feel clogged bilaterally. Has this chronically. Using flonase regularly. No ear pain.     She will be working as a  for [employer]. Needs proof of mmr, tdap, hep b, varicella. We have record of mmr and tdap. Will get titers for varicella and hep b.   She also needs quantiferon gold.     Wt Readings from Last 6 Encounters:   08/30/24 147 lb 12.8 oz (67 kg)   07/12/24 150 lb (68 kg)   05/02/24 150 lb 3.2 oz (68.1 kg)   04/12/24 149 lb 11.2 oz (67.9 kg)   03/01/24 148 lb 11.2 oz (67.4 kg)   10/26/23 144 lb (65.3 kg)     Body mass index is 26.39 kg/m².       Current Outpatient Medications   Medication Sig Dispense Refill    levothyroxine 88 MCG  Oral Tab Take 1 tablet (88 mcg total) by mouth before breakfast. 90 tablet 1    ATENOLOL 50 MG Oral Tab TAKE 1 TABLET DAILY 90 tablet 0    LISINOPRIL-HYDROCHLOROTHIAZIDE 20-12.5 MG Oral Tab TAKE 1 TABLET DAILY 90 tablet 0    fluticasone propionate 50 MCG/ACT Nasal Suspension 1 spray by Each Nare route daily. 3 each 1    Multiple Vitamins-Minerals (MULTI VITAMIN/MINERALS) Oral Tab Take 1 tablet by mouth daily.      Calcium Carbonate-Vitamin D (CALCIUM + D OR) Take 2 tablets by mouth daily.        Past Medical History:    Allergic rhinitis    Disorder of thyroid    Essential hypertension    High blood pressure    History of COVID-19    Pt had cough , sore throat and fatigue x 2 days.    Hx of motion sickness    Migraines    Multiple benign lumps of breast    Osteoarthritis    Osteopenia    Osteopenia    Primary thyroid cancer (HCC)    Visual impairment    glasses      Past Surgical History:   Procedure Laterality Date    Colonoscopy  03/2022    Hysterectomy      Partial hysterectomy, still has cervix    Nicolle biopsy stereo nodule 1 site left (cpt=19081)      Nicolle localization wire 1 site left (cpt=19281)      Nicolle localization wire 1 site right (cpt=19281)      Thyroidectomy Bilateral 07/06/2023      Family History   Problem Relation Age of Onset    Kidney Cancer Father     Hypertension Father     Osteoporosis Mother     Hypertension Mother     Dementia Mother     No Known Problems Daughter     No Known Problems Son     Hypertension Maternal Grandmother     Stroke Maternal Grandmother     Other (Emphysema) Maternal Grandfather     Other (Stomach cancer) Paternal Grandmother     No Known Problems Paternal Grandfather     No Known Problems Sister     Other (Kidney Stones) Sister     Other (Lupus) Sister     Other (Kidney Stones) Brother     Hypertension Brother     Breast Cancer Maternal Aunt 60    Breast Cancer Paternal Aunt 60    Genetic Disease Other       Social History:   Social History     Socioeconomic History     Marital status:     Number of children: 2   Tobacco Use    Smoking status: Former     Current packs/day: 0.25     Average packs/day: 0.3 packs/day for 2.0 years (0.5 ttl pk-yrs)     Types: Cigarettes     Passive exposure: Never    Smokeless tobacco: Never    Tobacco comments:     Smoked socially for 2 years in her 20s   Vaping Use    Vaping status: Never Used   Substance and Sexual Activity    Alcohol use: Yes     Comment: approx 2 drinks per week    Drug use: Never    Sexual activity: Yes     Partners: Male   Other Topics Concern    Exercise Yes    Seat Belt Yes    Stress Concern Yes    Caffeine Concern No    Special Diet No    Weight Concern No     Service No    Blood Transfusions No    Occupational Exposure No    Hobby Hazards No    Sleep Concern No    Back Care Yes    Bike Helmet Yes    Self-Exams Yes     : yes.    Exercise:  elliptical, strength .  Diet: watches calories closely     REVIEW OF SYSTEMS:   GENERAL: feels well otherwise  SKIN: denies any unusual skin lesions  EYES:denies blurred vision or double vision  HEENT: denies nasal congestion, sinus pain or ST  LUNGS: denies shortness of breath with exertion  CARDIOVASCULAR: denies chest pain on exertion  GI: denies abdominal pain,denies heartburn  : denies dysuria, vaginal discharge or itching  MUSCULOSKELETAL: denies back pain  NEURO: denies headaches  PSYCHE: denies depression or anxiety  HEMATOLOGIC: denies hx of anemia  ENDOCRINE: see hpi  ALL/ASTHMA: denies hx of allergy or asthma    EXAM:   /70 (BP Location: Right arm, Patient Position: Sitting, Cuff Size: adult)   Pulse 56   Temp 97.1 °F (36.2 °C) (Temporal)   Resp 12   Ht 5' 2.75\" (1.594 m)   Wt 147 lb 12.8 oz (67 kg)   Breastfeeding No   BMI 26.39 kg/m²   Body mass index is 26.39 kg/m².   GENERAL: well developed, well nourished,in no apparent distress  SKIN: no rashes,no suspicious lesions  HEENT: atraumatic, normocephalic,ears and throat are clear, has serous  fluid behind both TMs.   EYES:PERRLA, conjunctiva are clear  NECK: supple,no adenopathy,no bruits  CHEST: no chest tenderness  LUNGS: clear to auscultation  CARDIO: nl s1 and s2, RRR without murmur  GI: good BS's,no masses, HSM or tenderness  BREAST: no dominant or suspicious mass, no nipple discharge  GENITAL/URINARY:  External Genitalia:  General appearance; normal, Hair distribution; normal, Lesions absent, Urethral Meatus:  Size normal, Location normal, Lesions absent, Prolapse absent, Vagina:  General appearance normal, Lesions absent, Pelvic support normal, Cervix:  General appearance normal, Discharge absent, Tenderness absent, Enlargement absent, uterus absent, Adnexa:  Masses absent, Tenderness absent, Anus/Perineum:  Lesions absent and Masses absent  No pap today.   MUSCULOSKELETAL: back is not tender,FROM of the back  EXTREMITIES: no cyanosis, clubbing or edema  NEURO: Oriented times three,cranial nerves are intact,motor and sensory are grossly intact    Labs:   Lab Results   Component Value Date/Time    WBC 4.6 09/08/2023 08:01 AM    HGB 12.6 09/08/2023 08:01 AM     12/01/2023 08:12 AM      Lab Results   Component Value Date/Time    GLU 62 (L) 04/18/2024 07:41 AM     04/18/2024 07:41 AM    K 4.2 04/18/2024 07:41 AM    CL 98 04/18/2024 07:41 AM    CO2 31 04/18/2024 07:41 AM    CREATSERUM 0.81 04/18/2024 07:41 AM    CA 10.1 04/18/2024 07:41 AM    ALB 3.5 05/20/2023 09:13 AM    TP 7.4 05/20/2023 09:13 AM    ALKPHO 78 05/20/2023 09:13 AM    AST 21 05/20/2023 09:13 AM    ALT 27 05/20/2023 09:13 AM    BILT 0.4 05/20/2023 09:13 AM    TSH 0.34 (L) 07/01/2024 10:07 AM    T4F 1.5 07/01/2024 10:07 AM        Lab Results   Component Value Date/Time    CHOLEST 183 09/08/2023 08:01 AM    HDL 54 09/08/2023 08:01 AM    TRIG 77 09/08/2023 08:01 AM     (H) 09/08/2023 08:01 AM    NONHDLC 129 09/08/2023 08:01 AM       Lab Results   Component Value Date/Time    A1C 5.2 09/08/2023 08:01 AM      Vitamin D:     No results found for: \"VITD\"        ASSESSMENT AND PLAN:   Diana Perales is a 63 year old female who presents for a complete physical exam.   1. Physical exam, annual  Do labs.   Thyroid labs done by endocrine.   Pelvic and breast exam done.   Other HM and immunizations discussed.   Continue regular exercise.   - CBC With Differential With Platelet  - Comp Metabolic Panel  - Lipid Panel    2. Encounter for screening mammogram for malignant neoplasm of breast  - [facility] SHANDA 2D+3D SCREENING BILAT (CPT=77067/00978); Future    3. Postmenopausal  - XR DEXA BONE DENSITOMETRY (CPT=77080); Future    4. Immunity status testing  - Varicella IgG ([place]) [E]; Future  - Hepatitis B Surface Antibody [E]    5. Tuberculosis screening  - Quantiferon TB Plus    6. Essential hypertension  Continue meds.     7. Bilateral chronic serous otitis media  Discussed daily claritin and flonase.       Return in about 6 months (around 2/28/2025) for med check.      Lor Coy MD

## 2024-08-30 ENCOUNTER — OFFICE VISIT (OUTPATIENT)
Dept: INTERNAL MEDICINE CLINIC | Facility: CLINIC | Age: 64
End: 2024-08-30
Payer: COMMERCIAL

## 2024-08-30 VITALS
RESPIRATION RATE: 12 BRPM | HEIGHT: 62.75 IN | DIASTOLIC BLOOD PRESSURE: 70 MMHG | BODY MASS INDEX: 26.52 KG/M2 | TEMPERATURE: 97 F | HEART RATE: 56 BPM | SYSTOLIC BLOOD PRESSURE: 104 MMHG | WEIGHT: 147.81 LBS

## 2024-08-30 DIAGNOSIS — I10 ESSENTIAL HYPERTENSION: ICD-10-CM

## 2024-08-30 DIAGNOSIS — Z01.84 IMMUNITY STATUS TESTING: ICD-10-CM

## 2024-08-30 DIAGNOSIS — Z11.1 TUBERCULOSIS SCREENING: ICD-10-CM

## 2024-08-30 DIAGNOSIS — H65.23 BILATERAL CHRONIC SEROUS OTITIS MEDIA: ICD-10-CM

## 2024-08-30 DIAGNOSIS — Z78.0 POSTMENOPAUSAL: ICD-10-CM

## 2024-08-30 DIAGNOSIS — Z00.00 PHYSICAL EXAM, ANNUAL: Primary | ICD-10-CM

## 2024-08-30 DIAGNOSIS — Z12.31 ENCOUNTER FOR SCREENING MAMMOGRAM FOR MALIGNANT NEOPLASM OF BREAST: ICD-10-CM

## 2024-08-30 PROCEDURE — 99396 PREV VISIT EST AGE 40-64: CPT | Performed by: INTERNAL MEDICINE

## 2024-09-07 LAB
ABSOLUTE BASOPHILS: 28 CELLS/UL (ref 0–200)
ABSOLUTE EOSINOPHILS: 148 CELLS/UL (ref 15–500)
ABSOLUTE LYMPHOCYTES: 1068 CELLS/UL (ref 850–3900)
ABSOLUTE MONOCYTES: 320 CELLS/UL (ref 200–950)
ABSOLUTE NEUTROPHILS: 2436 CELLS/UL (ref 1500–7800)
ALBUMIN/GLOBULIN RATIO: 1.4 (CALC) (ref 1–2.5)
ALBUMIN: 4.1 G/DL (ref 3.6–5.1)
ALKALINE PHOSPHATASE: 73 U/L (ref 37–153)
ALT: 18 U/L (ref 6–29)
AST: 20 U/L (ref 10–35)
BASOPHILS: 0.7 %
BILIRUBIN, TOTAL: 0.5 MG/DL (ref 0.2–1.2)
BUN: 20 MG/DL (ref 7–25)
CALCIUM: 9.6 MG/DL (ref 8.6–10.4)
CARBON DIOXIDE: 29 MMOL/L (ref 20–32)
CHLORIDE: 102 MMOL/L (ref 98–110)
CHOL/HDLC RATIO: 3.6 (CALC)
CHOLESTEROL, TOTAL: 207 MG/DL
CREATININE: 0.93 MG/DL (ref 0.5–1.05)
EGFR: 69 ML/MIN/1.73M2
EOSINOPHILS: 3.7 %
GLOBULIN: 2.9 G/DL (CALC) (ref 1.9–3.7)
GLUCOSE: 85 MG/DL (ref 65–99)
HDL CHOLESTEROL: 58 MG/DL
HEMATOCRIT: 38.5 % (ref 35–45)
HEMOGLOBIN: 12.5 G/DL (ref 11.7–15.5)
LDL-CHOLESTEROL: 130 MG/DL (CALC)
LYMPHOCYTES: 26.7 %
MCH: 31.1 PG (ref 27–33)
MCHC: 32.5 G/DL (ref 32–36)
MCV: 95.8 FL (ref 80–100)
MITOGEN-NIL: 9.54 IU/ML
MONOCYTES: 8 %
MPV: 12.6 FL (ref 7.5–12.5)
NEUTROPHILS: 60.9 %
NIL: 0.04 IU/ML
NON-HDL CHOLESTEROL: 149 MG/DL (CALC)
PLATELET COUNT: 192 THOUSAND/UL (ref 140–400)
POTASSIUM: 4 MMOL/L (ref 3.5–5.3)
PROTEIN, TOTAL: 7 G/DL (ref 6.1–8.1)
QUANTIFERON(R)-TB GOLD PLUS, 1 TUBE: NEGATIVE
RDW: 11.8 % (ref 11–15)
RED BLOOD CELL COUNT: 4.02 MILLION/UL (ref 3.8–5.1)
SODIUM: 140 MMOL/L (ref 135–146)
TB1-NIL: 0 IU/ML
TB2-NIL: 0.02 IU/ML
TRIGLYCERIDES: 91 MG/DL
VARICELLA ZOSTER VIRUS$AB (IGG): >4000 INDEX
WHITE BLOOD CELL COUNT: 4 THOUSAND/UL (ref 3.8–10.8)

## 2024-09-09 ENCOUNTER — NURSE ONLY (OUTPATIENT)
Dept: INTERNAL MEDICINE CLINIC | Facility: CLINIC | Age: 64
End: 2024-09-09
Payer: COMMERCIAL

## 2024-09-09 DIAGNOSIS — Z01.84 IMMUNITY STATUS TESTING: Primary | ICD-10-CM

## 2024-09-09 DIAGNOSIS — J30.9 ALLERGIC RHINITIS, UNSPECIFIED SEASONALITY, UNSPECIFIED TRIGGER: ICD-10-CM

## 2024-09-09 PROCEDURE — 90471 IMMUNIZATION ADMIN: CPT | Performed by: INTERNAL MEDICINE

## 2024-09-09 PROCEDURE — 90746 HEPB VACCINE 3 DOSE ADULT IM: CPT | Performed by: INTERNAL MEDICINE

## 2024-09-09 RX ORDER — FLUTICASONE PROPIONATE 50 MCG
1 SPRAY, SUSPENSION (ML) NASAL DAILY
Qty: 3 EACH | Refills: 1 | Status: SHIPPED | OUTPATIENT
Start: 2024-09-09

## 2024-09-09 NOTE — TELEPHONE ENCOUNTER
Allergy Medication Protocol Passed09/09/2024 01:45 PM   Protocol Details In person appointment or virtual visit in the past 12 mos or appointment in next 3 mos

## 2024-09-11 ENCOUNTER — APPOINTMENT (OUTPATIENT)
Dept: OTHER | Facility: HOSPITAL | Age: 64
End: 2024-09-11
Attending: PREVENTIVE MEDICINE

## 2024-09-18 RX ORDER — LISINOPRIL AND HYDROCHLOROTHIAZIDE 12.5; 2 MG/1; MG/1
1 TABLET ORAL DAILY
Qty: 90 TABLET | Refills: 1 | Status: SHIPPED | OUTPATIENT
Start: 2024-09-18

## 2024-09-18 RX ORDER — ATENOLOL 50 MG/1
TABLET ORAL
Qty: 90 TABLET | Refills: 1 | Status: SHIPPED | OUTPATIENT
Start: 2024-09-18

## 2024-09-18 NOTE — TELEPHONE ENCOUNTER
Hypertension Medications Protocol [id number]/18/2024 07:06 AM   Protocol Details CMP or BMP in past 12 months    Last BP reading less than 140/90    In person appointment or virtual visit in the past 12 mos or appointment in next 3 mos    EGFRCR or GFRNAA > 50      . Essential hypertension  Continue meds.  '  Future Appointments   Date Time Provider Department Center   11/27/2024  8:40 AM PF TERRELL RM1 PF MAMMO [place]   11/27/2024  9:00 AM PF DEXA RM1 PF DEXA [place]   12/30/2024  7:30 AM PF US RM1 PF US [place]   1/24/2025  8:00 AM Shahla Duval DO [id number] [facility] [place]   3/4/2025  8:00 AM Lor Coy MD [facility] [place]   9/3/2025  8:00 AM Lor Coy MD [facility] [place]

## 2024-10-07 ENCOUNTER — NURSE ONLY (OUTPATIENT)
Dept: INTERNAL MEDICINE CLINIC | Facility: CLINIC | Age: 64
End: 2024-10-07
Payer: COMMERCIAL

## 2024-10-07 PROCEDURE — 90471 IMMUNIZATION ADMIN: CPT | Performed by: INTERNAL MEDICINE

## 2024-10-07 PROCEDURE — 90746 HEPB VACCINE 3 DOSE ADULT IM: CPT | Performed by: INTERNAL MEDICINE

## 2024-11-01 ENCOUNTER — HOSPITAL ENCOUNTER (OUTPATIENT)
Age: 64
Discharge: HOME | End: 2024-11-01
Payer: COMMERCIAL

## 2024-11-01 ENCOUNTER — APPOINTMENT (OUTPATIENT)
Dept: GENERAL RADIOLOGY | Age: 64
End: 2024-11-01
Attending: NURSE PRACTITIONER
Payer: COMMERCIAL

## 2024-11-01 VITALS
DIASTOLIC BLOOD PRESSURE: 73 MMHG | TEMPERATURE: 98 F | RESPIRATION RATE: 18 BRPM | SYSTOLIC BLOOD PRESSURE: 146 MMHG | HEART RATE: 55 BPM | OXYGEN SATURATION: 98 % | WEIGHT: 145 LBS | HEIGHT: 63 IN | BODY MASS INDEX: 25.69 KG/M2

## 2024-11-01 DIAGNOSIS — M79.646 FINGER PAIN: ICD-10-CM

## 2024-11-01 DIAGNOSIS — M25.541 ARTHRALGIA OF RIGHT HAND: Primary | ICD-10-CM

## 2024-11-01 PROCEDURE — 73140 X-RAY EXAM OF FINGER(S): CPT | Performed by: NURSE PRACTITIONER

## 2024-11-01 PROCEDURE — A4570 SPLINT: HCPCS | Performed by: NURSE PRACTITIONER

## 2024-11-01 PROCEDURE — 99213 OFFICE O/P EST LOW 20 MIN: CPT | Performed by: NURSE PRACTITIONER

## 2024-11-01 NOTE — ED PROVIDER NOTES
History     Chief Complaint   Patient presents with    Arm or Hand Injury     Dislocated thumb - Entered by patient       Subjective:   HPI    Diana Perales, 64 year old female with notable medical history of osteopenia, HTN who presents with thumb pain. Patient reports awakening today with Right thumb pain w/o known injury or precipitating events.     Of note, patient does typing for a career         Objective:   Past Medical History:    Allergic rhinitis    Disorder of thyroid    Essential hypertension    High blood pressure    History of COVID-19    Pt had cough , sore throat and fatigue x 2 days.    Hx of motion sickness    Migraines    Multiple benign lumps of breast    Osteoarthritis    Osteopenia    Osteopenia    Primary thyroid cancer (HCC)    Visual impairment    glasses              Past Surgical History:   Procedure Laterality Date    Colonoscopy  03/2022    Hysterectomy      Partial hysterectomy, still has cervix    Nicolle biopsy stereo nodule 1 site left (cpt=19081)      Nicolle localization wire 1 site left (cpt=19281)      Nicolle localization wire 1 site right (cpt=19281)      Thyroidectomy Bilateral 07/06/2023                Social History     Socioeconomic History    Marital status:     Number of children: 2   Tobacco Use    Smoking status: Former     Current packs/day: 0.25     Average packs/day: 0.3 packs/day for 2.0 years (0.5 ttl pk-yrs)     Types: Cigarettes     Passive exposure: Never    Smokeless tobacco: Never    Tobacco comments:     Smoked socially for 2 years in her 20s   Vaping Use    Vaping status: Never Used   Substance and Sexual Activity    Alcohol use: Yes     Comment: approx 2 drinks per week    Drug use: Never    Sexual activity: Yes     Partners: Male   Other Topics Concern    Exercise Yes    Seat Belt Yes    Stress Concern Yes    Caffeine Concern No    Special Diet No    Weight Concern No     Service No    Blood Transfusions No    Occupational Exposure No    Hobby  Hazards No    Sleep Concern No    Back Care Yes    Bike Helmet Yes    Self-Exams Yes              Medications Ordered Prior to Encounter[1]      Review of Systems   Musculoskeletal:  Positive for arthralgias and joint swelling.   All other systems reviewed and are negative.        Constitutional and vital signs reviewed.      All other systems reviewed and negative except as noted above.    I have reviewed the family history, social history, allergies, and outpatient medications.     History reviewed from EMR: Encounters, problem list, allergies, medications      Physical Exam     ED Triage Vitals [11/01/24 1308]   /73   Pulse 55   Resp 18   Temp 97.9 °F (36.6 °C)   Temp src Temporal   SpO2 98 %   O2 Device None (Room air)       Current:/73   Pulse 55   Temp 97.9 °F (36.6 °C) (Temporal)   Resp 18   Ht 160 cm (5' 3\")   Wt 65.8 kg   SpO2 98%   BMI 25.69 kg/m²       Physical Exam  Vitals and nursing note reviewed.   Constitutional:       General: She is not in acute distress.     Appearance: Normal appearance. She is normal weight. She is not ill-appearing or toxic-appearing.   HENT:      Head: Normocephalic and atraumatic.      Right Ear: External ear normal.      Left Ear: External ear normal.      Nose: Nose normal.      Mouth/Throat:      Mouth: Mucous membranes are moist.   Eyes:      Extraocular Movements: Extraocular movements intact.      Conjunctiva/sclera: Conjunctivae normal.      Pupils: Pupils are equal, round, and reactive to light.   Cardiovascular:      Rate and Rhythm: Normal rate.      Pulses: Normal pulses.   Pulmonary:      Effort: Pulmonary effort is normal. No respiratory distress.   Musculoskeletal:         General: No swelling, tenderness or signs of injury. Normal range of motion.      Right hand: Swelling present.      Cervical back: Normal range of motion and neck supple.      Comments: Notable swelling to Right thumb compared to Left. FROM, CMS intact, no acute skin injury  noted.   Skin:     General: Skin is warm and dry.      Capillary Refill: Capillary refill takes less than 2 seconds.      Coloration: Skin is not jaundiced.   Neurological:      General: No focal deficit present.      Mental Status: She is alert and oriented to person, place, and time. Mental status is at baseline.   Psychiatric:         Mood and Affect: Mood normal.         Behavior: Behavior normal.         Thought Content: Thought content normal.         Judgment: Judgment normal.            ED Course     Labs Reviewed - No data to display  XR FINGER(S) (MIN 2 VIEWS), RIGHT THUMB (CPT=73140)   Final Result   PROCEDURE:  XR FINGER(S) (MIN 2 VIEWS), RIGHT THUMB (CPT=73140)       INDICATIONS:  Patient presents with a history of pain and swelling to the    right thumb.       COMPARISON:  None.       TECHNIQUE:  Three views of the finger were obtained.       PATIENT STATED HISTORY: (As transcribed by Technologist)  Patient    complains of right thumb pain for about 8 hours. Patient states the pain    and swelling began without any injury.            FINDINGS:  No acute osseous abnormalities are noted.  Mild marginal    osteophytes of the interphalangeal joint suspicious for chondromalacia    versus early osteoarthritis.  No significant joint space loss.  No soft    tissue swelling.                             =====   CONCLUSION:     1. Mild marginal osteophytes involving the interphalangeal joint of the    thumb, suspicious for chondromalacia versus early osteoarthritis.   2. No acute process identified.           LOCATION:  [facility]           Dictated by (CST): Domingo Pickens DO on 11/01/2024 at 1:36 PM        Finalized by (CST): Domingo Pickens DO on 11/01/2024 at 1:37 PM             Vitals:    11/01/24 1308   BP: 146/73   Pulse: 55   Resp: 18   Temp: 97.9 °F (36.6 °C)   TempSrc: Temporal   SpO2: 98%   Weight: 65.8 kg   Height: 160 cm (5' 3\")            [facility]        Diana Perales, 64 year old female with medical history  as noted above who presents with Right thumb pain   - Patient in NAD, VSS   - sprain vs OA vs other   - Xray ordered        ** See ED course below for additional information on care provided / interventions / notable events throughout patient's encounter.    ED Course as of 11/01/24 1357  ------------------------------------------------------------  Time: 11/01 1355  Comment: Radiology noting no acute process, but suspicion for OA vs chondromalacia; patient made aware.  Finger splint provided  Ortho contact provided (if needed)  Supportive care discussed       ** I have independently reviewed the radiology images, clinical lab results, and ECG tracings as described above (if applicable)    ** Concerning co-morbidities possibly affecting complaint / care: Hx osteopenia     ** See below for home care instructions (if applicable)          Medical Decision Making  Amount and/or Complexity of Data Reviewed  Radiology: ordered and independent interpretation performed. Decision-making details documented in ED Course.    Risk  OTC drugs.        Disposition and Plan     Clinical Impression:  1. Arthralgia of right hand    2. Finger pain         Disposition:  Discharge  11/1/2024  1:49 pm    Follow-up:  Tea Ashley PA  [address] [place] [id number] [phone]      Ortho contact (if needed)          Medications Prescribed:  Current Discharge Medication List          The above patient (and/or guardian) was made aware that an appropriate evaluation has been performed, and that no additional testing is required at this time. In my medical judgment, there is currently no evidence of an immediate life-threatening or surgical condition, therefore discharge is indicated at this time. The patient (and/or guardian) was advised that a small risk still exists that a serious condition could develop. The patient was instructed to arrange close follow-up with their primary care provider (or the referral provider  given today). The patient received written and verbal instructions regarding their condition / concerns, demonstrated understanding, and is agreement with the outpatient treatment plan.        Home care instructions:    Supportive Care Measures:   - Naproxen (440mg - 500mg) or Ibuprofen (600mg) as needed for pain   - Apply finger splint or thumb spica splint as much as possible to promote healing   - Ice as tolerated for pain / swelling   - You may benefit from over-the-counter topical pain medication such as: Voltaren (Diclofenac), Icy/Hot, Biofreeze, Bengay, Lidocaine, etc.   - Avoid excessive use of area until symptoms resolve   - You may benefit from Epsom salt soaks in warm water throughout the day - 20min at a time   - Follow up with your doctor (or orthopedic specialist) as needed       Han Teran, DNP, APRN, AGACNP-BC, FNP-C, CNL  Adult-Gerontology Acute Care & Family Nurse Practitioner  [facility]                   [1]   No current facility-administered medications on file prior to encounter.     Current Outpatient Medications on File Prior to Encounter   Medication Sig Dispense Refill    lisinopril-hydroCHLOROthiazide 20-12.5 MG Oral Tab TAKE 1 TABLET DAILY 90 tablet 1    atenolol 50 MG Oral Tab TAKE 1 TABLET DAILY 90 tablet 1    fluticasone propionate 50 MCG/ACT Nasal Suspension 1 spray by Each Nare route daily. 3 each 1    levothyroxine 88 MCG Oral Tab Take 1 tablet (88 mcg total) by mouth before breakfast. 90 tablet 1    Multiple Vitamins-Minerals (MULTI VITAMIN/MINERALS) Oral Tab Take 1 tablet by mouth daily.      Calcium Carbonate-Vitamin D (CALCIUM + D OR) Take 2 tablets by mouth daily.

## 2024-11-01 NOTE — ED INITIAL ASSESSMENT (HPI)
Patient states she woke up this morning with pain to right thumb. Pain started to radiate towards hand. Slight swelling observed. No known injury. States she feels some \"popping\" and like it might be out of place.

## 2024-11-01 NOTE — DISCHARGE INSTRUCTIONS
Supportive Care Measures:   - Naproxen (440mg - 500mg) or Ibuprofen (600mg) as needed for pain   - Apply finger splint or thumb spica splint as much as possible to promote healing   - Ice as tolerated for pain / swelling   - You may benefit from over-the-counter topical pain medication such as: Voltaren (Diclofenac), Icy/Hot, Biofreeze, Bengay, Lidocaine, etc.   - Avoid excessive use of area until symptoms resolve   - You may benefit from Epsom salt soaks in warm water throughout the day - 20min at a time   - Follow up with your doctor (or orthopedic specialist) as needed

## 2024-11-27 ENCOUNTER — HOSPITAL ENCOUNTER (OUTPATIENT)
Dept: MAMMOGRAPHY | Age: 64
Discharge: HOME | End: 2024-11-27
Attending: INTERNAL MEDICINE
Payer: COMMERCIAL

## 2024-11-27 ENCOUNTER — HOSPITAL ENCOUNTER (OUTPATIENT)
Dept: BONE DENSITY | Age: 64
Discharge: HOME | End: 2024-11-27
Attending: INTERNAL MEDICINE
Payer: COMMERCIAL

## 2024-11-27 DIAGNOSIS — Z12.31 ENCOUNTER FOR SCREENING MAMMOGRAM FOR MALIGNANT NEOPLASM OF BREAST: ICD-10-CM

## 2024-11-27 DIAGNOSIS — Z78.0 POSTMENOPAUSAL: ICD-10-CM

## 2024-11-27 PROCEDURE — 77067 SCR MAMMO BI INCL CAD: CPT | Performed by: INTERNAL MEDICINE

## 2024-11-27 PROCEDURE — 77063 BREAST TOMOSYNTHESIS BI: CPT | Performed by: INTERNAL MEDICINE

## 2024-11-27 PROCEDURE — 77080 DXA BONE DENSITY AXIAL: CPT | Performed by: INTERNAL MEDICINE

## 2024-12-15 ENCOUNTER — PATIENT MESSAGE (OUTPATIENT)
Facility: CLINIC | Age: 64
End: 2024-12-15

## 2024-12-15 DIAGNOSIS — E89.0 POST-SURGICAL HYPOTHYROIDISM: ICD-10-CM

## 2024-12-15 DIAGNOSIS — C73 PRIMARY THYROID CANCER (HCC): ICD-10-CM

## 2024-12-16 NOTE — TELEPHONE ENCOUNTER
Spoke with patient on telephone. States with no way to put in [employer] message, she would prefer picking up labs in person vs mailing orders.    States she will have  come and grab lab orders- requesting ID check when he does come  documents.     Printed lab orders and placed in envelope in RN brown folder.

## 2024-12-17 RX ORDER — LEVOTHYROXINE SODIUM 88 MCG
88 TABLET ORAL
Qty: 90 TABLET | Refills: 1 | Status: SHIPPED | OUTPATIENT
Start: 2024-12-17

## 2024-12-17 NOTE — TELEPHONE ENCOUNTER
Endocrine refill protocol for medications for hypothyroidism and hyperthyroidism    Protocol Criteria:  FAILED Reason: N/A    If all below requirements are met, send a 90-day supply with 1 refill per provider protocol.    Verify appointment with Endocrinology completed in the last 12 months or scheduled in the next 6 months.    Normal TSH result in the past 12 months   Review recent telephone encounters and mychart communications with patient to ensure a dose change has not occurred since last office visit that was not updated in the medication history list     Last completed office visit:7/12/2024 Shahla Duval DO   Next scheduled Follow up:   Future Appointments   Date Time Provider Department Center   12/30/2024  7:30 AM PF US RM1 PF US [place]   2/7/2025  8:00 AM Shahla Duval DO [id number] [facility] [place]   3/10/2025  4:40 PM Lor Coy MD [facility] 29 [facility] [place]   9/3/2025  8:00 AM Lor Coy MD EMG 29 [facility] [place]      Last TSH result:   TSH   Date Value Ref Range Status   07/01/2024 0.34 (L) 0.40 - 4.50 mIU/L Final       Routed for review.

## 2024-12-30 ENCOUNTER — PATIENT MESSAGE (OUTPATIENT)
Facility: CLINIC | Age: 64
End: 2024-12-30

## 2024-12-30 ENCOUNTER — HOSPITAL ENCOUNTER (OUTPATIENT)
Dept: ULTRASOUND IMAGING | Age: 64
Discharge: HOME | End: 2024-12-30
Attending: STUDENT IN AN ORGANIZED HEALTH CARE EDUCATION/TRAINING PROGRAM
Payer: COMMERCIAL

## 2024-12-30 DIAGNOSIS — C73 PRIMARY THYROID CANCER (HCC): ICD-10-CM

## 2024-12-30 PROCEDURE — 76536 US EXAM OF HEAD AND NECK: CPT | Performed by: STUDENT IN AN ORGANIZED HEALTH CARE EDUCATION/TRAINING PROGRAM

## 2024-12-30 NOTE — TELEPHONE ENCOUNTER
Received MY CHART MESSAGE from patient.    Sent MY CHART MESSAGE to patient with office hours this week.

## 2024-12-30 NOTE — TELEPHONE ENCOUNTER
Correct, she already completed the US which showed a 13 x 3 x 4 mm elongated region of hypoechogenicity. Once she completes the tumor marker I can help guide if further imaging is warranted.

## 2024-12-30 NOTE — TELEPHONE ENCOUNTER
Routed for review    Last office note: - Goal TSH 0.1-0.5  - continue LT4 88mcg  - labs and ultrasound prior to next appointment   - Will continue routine surveillance with imaging and biochemical studies

## 2025-01-03 LAB
T4, FREE: 1.5 NG/DL (ref 0.8–1.8)
THYROGLOBULIN (BECKMAN SECOND GENERATION): <0.1 NG/ML
THYROGLOBULIN ANTIBODY: <1 IU/ML
TSH: 0.31 MIU/L (ref 0.4–4.5)

## 2025-01-28 ENCOUNTER — PATIENT MESSAGE (OUTPATIENT)
Facility: CLINIC | Age: 65
End: 2025-01-28

## 2025-01-29 NOTE — TELEPHONE ENCOUNTER
[facility]ed [facility] provided. Received fax confirmation.   Sent patient MY CHART MESSAGE.

## 2025-02-03 ENCOUNTER — PATIENT MESSAGE (OUTPATIENT)
Dept: INTERNAL MEDICINE CLINIC | Facility: CLINIC | Age: 65
End: 2025-02-03

## 2025-02-06 LAB
T4, FREE: 1.6 NG/DL (ref 0.8–1.8)
THYROGLOBULIN (BECKMAN SECOND GENERATION): 0.2 NG/ML
THYROGLOBULIN ANTIBODY: <1 IU/ML
TSH: 0.17 MIU/L (ref 0.4–4.5)

## 2025-02-09 NOTE — PROGRESS NOTES
I have reviewed her labs and her TSH goal is 0.1-0.5.  Her current TSH is at goal.  We will also continue to trend her tumor marker as she previously had a 1.3 cm region on her ultrasound in December 2024.  She is to follow-up with me in April 2025 and we can discuss next steps at that time in terms of imaging and further testing.  She is to continue her current dose of levothyroxine.  Please let me know if she has any questions.

## 2025-02-10 ENCOUNTER — TELEPHONE (OUTPATIENT)
Facility: CLINIC | Age: 65
End: 2025-02-10

## 2025-02-10 DIAGNOSIS — C73 PRIMARY THYROID CANCER (HCC): Primary | ICD-10-CM

## 2025-02-10 NOTE — TELEPHONE ENCOUNTER
Lab result note:  TSH goal is 0.1-0.5.  Her current TSH is at goal.  We will also continue to trend her tumor marker as she previously had a 1.3 cm region on her ultrasound in December 2024.  She is to follow-up with me in April 2025 and we can discuss next steps at that time in terms of imaging and further testing.  She is to continue her current dose of levothyroxine.  Please let me know if she has any questions.     Patient phoned in to discuss- reviewed note to verbalized understanding.    Pended possible repeat labs for April follow up.    Patient to call [facility] regarding prescription- will call office if there are any issues.

## 2025-03-07 ENCOUNTER — PATIENT MESSAGE (OUTPATIENT)
Facility: CLINIC | Age: 65
End: 2025-03-07

## 2025-03-07 DIAGNOSIS — E89.0 POST-SURGICAL HYPOTHYROIDISM: ICD-10-CM

## 2025-03-07 DIAGNOSIS — C73 PRIMARY THYROID CANCER (HCC): Primary | ICD-10-CM

## 2025-03-07 NOTE — TELEPHONE ENCOUNTER
Per labs done on 12/31- pt to have repeat thyroid US. This is scheduled already 3/24.    Confirming what labs patient should do? TSH and Free T4 in the system already.

## 2025-03-10 ENCOUNTER — OFFICE VISIT (OUTPATIENT)
Dept: INTERNAL MEDICINE CLINIC | Facility: CLINIC | Age: 65
End: 2025-03-10
Payer: COMMERCIAL

## 2025-03-10 VITALS
RESPIRATION RATE: 12 BRPM | HEART RATE: 56 BPM | BODY MASS INDEX: 25.96 KG/M2 | TEMPERATURE: 98 F | HEIGHT: 62.75 IN | DIASTOLIC BLOOD PRESSURE: 72 MMHG | WEIGHT: 144.69 LBS | SYSTOLIC BLOOD PRESSURE: 122 MMHG

## 2025-03-10 DIAGNOSIS — Z23 NEED FOR VACCINATION: ICD-10-CM

## 2025-03-10 DIAGNOSIS — Z85.850 HISTORY OF THYROID CANCER: ICD-10-CM

## 2025-03-10 DIAGNOSIS — M81.0 AGE-RELATED OSTEOPOROSIS WITHOUT CURRENT PATHOLOGICAL FRACTURE: ICD-10-CM

## 2025-03-10 DIAGNOSIS — I10 ESSENTIAL HYPERTENSION: ICD-10-CM

## 2025-03-10 PROCEDURE — 99214 OFFICE O/P EST MOD 30 MIN: CPT | Performed by: INTERNAL MEDICINE

## 2025-03-10 PROCEDURE — 90471 IMMUNIZATION ADMIN: CPT | Performed by: INTERNAL MEDICINE

## 2025-03-10 PROCEDURE — 90746 HEPB VACCINE 3 DOSE ADULT IM: CPT | Performed by: INTERNAL MEDICINE

## 2025-03-10 RX ORDER — ATENOLOL 50 MG/1
TABLET ORAL
Qty: 90 TABLET | Refills: 1 | Status: SHIPPED | OUTPATIENT
Start: 2025-03-10

## 2025-03-10 RX ORDER — LISINOPRIL AND HYDROCHLOROTHIAZIDE 12.5; 2 MG/1; MG/1
1 TABLET ORAL DAILY
Qty: 90 TABLET | Refills: 1 | Status: SHIPPED | OUTPATIENT
Start: 2025-03-10

## 2025-03-10 NOTE — PROGRESS NOTES
[facility]    CHIEF COMPLAINT:    Chief Complaint   Patient presents with    Medication Follow-Up     11/4/22-pap. 11/27/24-mammo. 3/2/22-colon-repeat 10 years    Test Results    Immunization/Injection     3rd hep b         HISTORY OF PRESENT ILLNESS:  here for med check.   Htn: Taking meds regularly. No chest pain, no shortness of breath.     Mood disroder: not on meds any more. Doing well off meds.     Hypothyroid: on levothyroxine. No fatigue or palpitations.   history of thyroid cancer, s/p thyroidectomy.   Last us in 12/2024 showed a 13 x 3 x 4 mm elongated region of hypoechogenicity.  This may represent scarring or a lymph node.   She is to repeat this in 3 months and follow up with endocrine.     She had a dexa in 12/2024 that showed osteoporosis.     Due for hep b vaccine #3 today.       REVIEW OF SYSTEMS:  See HPI    Current Medications:    Current Outpatient Medications   Medication Sig Dispense Refill    atenolol 50 MG Oral Tab TAKE 1 TABLET DAILY 90 tablet 1    lisinopril-hydroCHLOROthiazide 20-12.5 MG Oral Tab Take 1 tablet by mouth daily. 90 tablet 1    SYNTHROID 88 MCG Oral Tab TAKE 1 TABLET BEFORE       BREAKFAST 90 tablet 1    fluticasone propionate 50 MCG/ACT Nasal Suspension 1 spray by Each Nare route daily. 3 each 1    Multiple Vitamins-Minerals (MULTI VITAMIN/MINERALS) Oral Tab Take 1 tablet by mouth daily.      Calcium Carbonate-Vitamin D (CALCIUM + D OR) Take 2 tablets by mouth daily.         PAST MEDICAL, SOCIAL, AND FAMILY HISTORY:  Tobacco:    History   Smoking Status    Former    Types: Cigarettes   Smokeless Tobacco    Never       PHYSICAL EXAM:  /72 (BP Location: Left arm, Patient Position: Sitting, Cuff Size: adult)   Pulse 56   Temp 97.9 °F (36.6 °C) (Temporal)   Resp 12   Ht 5' 2.75\" (1.594 m)   Wt 144 lb 11.2 oz (65.6 kg)   Breastfeeding No   BMI 25.84 kg/m²    GENERAL: well developed, well nourished,in no apparent distress  LUNGS: clear to  auscultation  CARDIO: RRR without murmur  GI: good BS's,no masses, HSM or tenderness  MUSCULOSKELETAL:  no edema, muscle strength normal.     DATA:  Results for orders placed or performed in visit on 01/30/25   TSH and Free T4    Collection Time: 01/30/25  7:09 AM   Result Value Ref Range    TSH 0.17 (L) 0.40 - 4.50 mIU/L    T4, FREE 1.6 0.8 - 1.8 ng/dL   THYROID CANCER MONITORING    Collection Time: 01/30/25  7:09 AM   Result Value Ref Range    THYROGLOBULIN ANTIBODY <1 < OR = 1 IU/mL    THYROGLOBULIN (JERMAINE SECOND GENERATION) 0.2 (H) ng/mL            ASSESSMENT AND PLAN:  1. Essential hypertension  Continue meds.   Do bmp.   - Basic Metabolic Panel (8) [E]  - atenolol 50 MG Oral Tab; TAKE 1 TABLET DAILY  Dispense: 90 tablet; Refill: 1  - lisinopril-hydroCHLOROthiazide 20-12.5 MG Oral Tab; Take 1 tablet by mouth daily.  Dispense: 90 tablet; Refill: 1    2. Age-related osteoporosis without current pathological fracture  Continue calcium and vit d intake.   She is agreeable to starting meds but is currently getting dental work. Has an implant and will be getting a crown in July.   Discussed alendronate but will hold off on starting this until after dental work is done.   Until then keep up with calcium and vit d and weight bearing exercises.   - VITAMIN D, 25-HYDROXY [89874][Q]    3. History of thyroid cancer  follow up with endocrine.     4. Need for vaccination  - Hep B, Adult [96013]        Return in about 6 months (around 9/10/2025) for annual wellness visit, med check.      Lor Coy MD

## 2025-03-10 NOTE — PATIENT INSTRUCTIONS
For osteoporosis:   Recommend taking alendronate 70mg once a week. We will not start this until all your dental work is completed. Get clearance to start this from your dentist.

## 2025-03-13 LAB
BUN/CREATININE RATIO: 28 (CALC) (ref 6–22)
BUN: 34 MG/DL (ref 7–25)
CALCIUM: 10.2 MG/DL (ref 8.6–10.4)
CARBON DIOXIDE: 31 MMOL/L (ref 20–32)
CHLORIDE: 96 MMOL/L (ref 98–110)
CREATININE: 1.21 MG/DL (ref 0.5–1.05)
EGFR: 50 ML/MIN/1.73M2
GLUCOSE: 89 MG/DL (ref 65–99)
POTASSIUM: 3.7 MMOL/L (ref 3.5–5.3)
SODIUM: 135 MMOL/L (ref 135–146)
T4, FREE: 1.7 NG/DL (ref 0.8–1.8)
TSH: 0.42 MIU/L (ref 0.4–4.5)
VITAMIN D, 25-OH, TOTAL: 79 NG/ML (ref 30–100)

## 2025-03-14 DIAGNOSIS — R79.89 ELEVATED SERUM CREATININE: Primary | ICD-10-CM

## 2025-03-22 DIAGNOSIS — I10 ESSENTIAL HYPERTENSION: ICD-10-CM

## 2025-03-24 ENCOUNTER — HOSPITAL ENCOUNTER (OUTPATIENT)
Dept: ULTRASOUND IMAGING | Age: 65
Discharge: HOME | End: 2025-03-24
Attending: STUDENT IN AN ORGANIZED HEALTH CARE EDUCATION/TRAINING PROGRAM
Payer: COMMERCIAL

## 2025-03-24 DIAGNOSIS — C73 PRIMARY THYROID CANCER (HCC): ICD-10-CM

## 2025-03-24 PROCEDURE — 76536 US EXAM OF HEAD AND NECK: CPT | Performed by: STUDENT IN AN ORGANIZED HEALTH CARE EDUCATION/TRAINING PROGRAM

## 2025-03-25 DIAGNOSIS — I10 ESSENTIAL HYPERTENSION: ICD-10-CM

## 2025-03-25 RX ORDER — LISINOPRIL AND HYDROCHLOROTHIAZIDE 12.5; 2 MG/1; MG/1
1 TABLET ORAL DAILY
Qty: 90 TABLET | Refills: 1 | OUTPATIENT
Start: 2025-03-25

## 2025-03-25 RX ORDER — ATENOLOL 50 MG/1
TABLET ORAL
Qty: 90 TABLET | Refills: 1 | OUTPATIENT
Start: 2025-03-25

## 2025-03-25 NOTE — TELEPHONE ENCOUNTER
Rx's Denied 2nd Time - REFILLED 3/10/25, 90/1     LM's Advising Patient of this and why it was denied the first time - Duplicate Request.

## 2025-04-03 ENCOUNTER — OFFICE VISIT (OUTPATIENT)
Facility: CLINIC | Age: 65
End: 2025-04-03
Payer: COMMERCIAL

## 2025-04-03 VITALS
HEIGHT: 62 IN | WEIGHT: 144 LBS | SYSTOLIC BLOOD PRESSURE: 126 MMHG | BODY MASS INDEX: 26.5 KG/M2 | OXYGEN SATURATION: 99 % | HEART RATE: 62 BPM | DIASTOLIC BLOOD PRESSURE: 80 MMHG

## 2025-04-03 DIAGNOSIS — C73 PRIMARY THYROID CANCER (HCC): ICD-10-CM

## 2025-04-03 DIAGNOSIS — E89.0 POST-SURGICAL HYPOTHYROIDISM: ICD-10-CM

## 2025-04-03 PROCEDURE — 99214 OFFICE O/P EST MOD 30 MIN: CPT | Performed by: STUDENT IN AN ORGANIZED HEALTH CARE EDUCATION/TRAINING PROGRAM

## 2025-04-03 RX ORDER — LEVOTHYROXINE SODIUM 88 UG/1
88 TABLET ORAL
COMMUNITY
Start: 2025-04-03

## 2025-04-03 NOTE — PATIENT INSTRUCTIONS
Visit Summary  Please complete your labs around beginning of September and I will be in touch with next steps  We will plan for a thyroid ultrasound (ultrasound head/neck) around the end of September  We will plan on following up around the beginning of October     General follow up information:  Please let us know if you require any refills at least 1 week prior to your medication running out. If you do run out of medication, please call our office ASAP to request refills (do not wait until your follow up).  Please call us if you experience any problems with insurance coverage of medication, lab work, or imaging.   Lab results and imaging will typically be reviewed at follow up appointments, or within 3-5 business days of ALL results being in if you do not have an appointment scheduled in the near future. Our office will contact you for any abnormal results requiring more urgent follow up or action.  The on-call pager is for urgent matters only. If you are a type 1 diabetic and run out of insulin after business hours 8AM-4PM, you may call the on-call pager for a refill to a 24 hour pharmacy. If you have adrenal insufficiency and run out of steroids, you may call the on-call pager for a refill to a 24 hours pharmacy. All other refill requests should be requested during business hours.    Return Visit   [x]  Dr. Duval in 6 months   [] Virtual visit  [] No follow up appointment needed  [] Follow up to be scheduled pending      []  Fasting/8AM labs  []  Central scheduling # for ultrasound/nuclear med/CT/MRI/DXA/IR  []  Provide flyer for:  [] ENT   [] Weight Management  [] Infertility/Reproductive Endocrinology  [] Transgender care  []  Directions to 1st floor lab  [] Collect urine collection jug  [] Collect salivary cortisol tubes  []  Dental clearance form  []  Will need authorization for outside records

## 2025-04-03 NOTE — PROGRESS NOTES
Endocrinology Clinic Note    Name: Diana Perales    Date: 4/3/2025      HISTORY OF PRESENT ILLNESS   Diana Peraels is a 64 year old female with PMHx significant for thyroid CA (follicular and papillary) s/p TT in July 2023 who presents for consultation for thyroid management.    Initial HPI consult in July 2023  (-) Fhx of thyroid disease   Pt had herself felt L side nodule, seen by ENT and proceeded with workup.    Surgery: Total thyroidectomy on 7/6/23 by Dr. Okeefe   Path showed: multifocal tumors  1)  larger tumor in L mid/upper lbe is follicular 2.6cm, focally extends to soft tissue margin, +capsular invasion, +focal angioinvasion  2) smaller tumor in L lower lobe is papillary 1.1cm focally extends to soft tissue margin  3) 1/1 +LN with calcifications  4) R inf parathyroid gland  Stage: pT2N0a (follicular), [id number] (papillary)    Has seen Dr Okeefe for post-op f/u. Here to discuss RAIA.  Feels well post-op, no hypocalcemia, no voice change.  Daughter is getting  in the fall    Interim hx:  Oct 2023  8/7/2023 - received 150.1 mCi I-131; post-treatment WBS showed intense uptake b/l in thyroid bed, no actiity outside of thyroid bed  9/2023 - TSH 0.98, no fT4 drawn by [facility] (ordered the same date) -- on LT4 88mcg  Eating well   Feels globus sensation; more noticeable when she's focused on it  No voice hoarseness  Doesn't have compressive sx when she eats    Jan 2024 12/2023 - fT4 1.4, TT3 102, TSH 0.68, Tg undetectable, Tg ab neg  1/2024 - thyroid US shows s/p TT, no abnl soft tissues in thyroidectomy bed    July 2024  Pt would like refill on Levothyroxine, currently on 88 MCG   Does note heat intolerance, some joint pain. Denies palpitations or tremors  7/1/2024-Free T41.5, TSH 0.34, total T3 125, TG less than 0.1, TG antibody less than 0.1    REVIEW OF SYSTEMS  Ten point review of systems has been performed and is otherwise negative and/or non-contributory, except as described above.    Medications:      Current Outpatient Medications:     levothyroxine (SYNTHROID) 88 MCG Oral Tab, Take 1 tablet (88 mcg total) by mouth before breakfast., Disp: , Rfl:     atenolol 50 MG Oral Tab, TAKE 1 TABLET DAILY, Disp: 90 tablet, Rfl: 1    lisinopril-hydroCHLOROthiazide 20-12.5 MG Oral Tab, Take 1 tablet by mouth daily., Disp: 90 tablet, Rfl: 1    fluticasone propionate 50 MCG/ACT Nasal Suspension, 1 spray by Each Nare route daily., Disp: 3 each, Rfl: 1    Multiple Vitamins-Minerals (MULTI VITAMIN/MINERALS) Oral Tab, Take 1 tablet by mouth daily., Disp: , Rfl:     Calcium Carbonate-Vitamin D (CALCIUM + D OR), Take 2 tablets by mouth daily., Disp: , Rfl:      Allergies:   Allergies   Allergen Reactions    Codeine DIZZINESS    Seasonal OTHER (SEE COMMENTS)     Migraines, Sinus Issues       Social History:   Social History     Socioeconomic History    Marital status:     Number of children: 2   Tobacco Use    Smoking status: Former     Current packs/day: 0.25     Average packs/day: 0.3 packs/day for 2.0 years (0.5 ttl pk-yrs)     Types: Cigarettes     Passive exposure: Never    Smokeless tobacco: Never    Tobacco comments:     Smoked socially for 2 years in her 20s   Vaping Use    Vaping status: Never Used   Substance and Sexual Activity    Alcohol use: Not Currently    Drug use: Never    Sexual activity: Yes     Partners: Male   Other Topics Concern    Exercise Yes    Seat Belt Yes    Stress Concern Yes    Caffeine Concern No    Special Diet No    Weight Concern No     Service No    Blood Transfusions No    Occupational Exposure No    Hobby Hazards No    Sleep Concern No    Back Care Yes    Bike Helmet Yes    Self-Exams Yes       Medical History:   Past Medical History:    Allergic rhinitis    Disorder of thyroid    Essential hypertension    High blood pressure    History of COVID-19    Pt had cough , sore throat and fatigue x 2 days.    Hx of motion sickness    Migraines    Multiple benign lumps of breast     Osteoarthritis    Osteopenia    Osteopenia    Primary thyroid cancer (HCC)    Visual impairment    glasses         Surgical history:   Past Surgical History:   Procedure Laterality Date    Colonoscopy  03/2022    Hysterectomy      Partial hysterectomy, still has cervix    Nicolle biopsy stereo nodule 1 site left (cpt=19081)      Nicolle localization wire 1 site left (cpt=19281)      Nicolle localization wire 1 site right (cpt=19281)      Needle biopsy right Right     benign bx    Thyroidectomy Bilateral 07/06/2023       PHYSICAL EXAMINATION:  /80   Pulse 62   Ht 5' 2\" (1.575 m)   Wt 144 lb (65.3 kg)   SpO2 99%   BMI 26.34 kg/m²     CONSTITUTIONAL:  awake, alert, cooperative, no apparent distress, and appears stated age  PSYCH: normal affect  ENT:  Normocephalic, atraumatic  NECK:  Supple, symmetrical, thyroidectomy scar at base of neck, no palpable nodules/nodes, no tenderness  LUNGS: breathing comfortably  CARDIOVASCULAR:  regular rate       Labs:  Lab Results   Component Value Date    T4F 1.7 03/12/2025    TSH 0.42 03/12/2025    TSHT4 0.98 09/21/2023      Recent Labs     09/21/23  0722 11/06/23  1236 12/31/24  0642 01/30/25  0709 03/12/25  0720   T4F  --    < > 1.5 1.6 1.7   TSH  --    < > 0.31* 0.17* 0.42   TSHT4 0.98  --   --   --   --     < > = values in this interval not displayed.        Imaging:  Pertinent imaging reviewed    ASSESSMENT/PLAN:      ICD-10-CM    1. Primary thyroid cancer (HCC)  C73 Thyroglobulin Antibody and Tumor Marker     levothyroxine 88 MCG Oral Tab     US HEAD/NECK (CPT=76536)      2. Post-surgical hypothyroidism  E89.0 TSH and Free T4 [E]     levothyroxine 88 MCG Oral Tab     TSH and Free T4 [E]           #Primary thyroid cancer (HCC)    # Post-surgical hypothyroidism  Plan:     Treatment history  Surgery: Total thyroidectomy on 7/6/23 by Dr. Okeefe   Path showed: multifocal tumors  1)  larger tumor in L mid/upper lbe is follicular 2.6cm, focally extends to soft tissue margin, +capsular  invasion, +focal angioinvasion  2) smaller tumor in L lower lobe is papillary 1.1cm focally extends to soft tissue margin  3) 1/1 +LN with calcifications  4) R inf parathyroid gland  Stage: pT2N0a (follicular), [id number] (papillary)    Remnant ablation:   8/7/2023 - received 150.1 mCi I-131; post-treatment WBS showed intense uptake b/l in thyroid bed, no actiity outside of thyroid bed    Surveillance history  Thyroid US:  1/2024 - thyroid US shows s/p TT, no abnl soft tissues in thyroidectomy bed  12/2024- Thyroid US with 12x3x4 mm region in right thyroid bed   3/2025- Thyroid US with 15x3x5 mm lymph node with fatty hilum within right thyroid fossa    Labs:   9/2023 - TSH 0.98, no fT4 drawn by [facility] (ordered the same date) -- on LT4 88mcg  12/2023 - fT4 1.4, TT3 102, TSH 0.68, Tg undetectable, Tg ab neg - LT4 88mcg  7/1/2024-Free T41.5, TSH 0.34, total T3 125, TG less than 0.1, TG antibody less than 0.1  12/2024 TSH 0.31, FT4 1.5, tg <0.1, tg ab <0.1   1/2025 TSH 0.17, FT4 1.6, tg 0.2  3/2025 TSH 0.42, FT4 1.7    - Goal TSH 0.1-0.5  - continue LT4 88mcg  - labs and ultrasound prior to next appointment   - Will continue routine surveillance with imaging and biochemical studies      #Osteoporosis   Past medical history of osteoporosis based on bone density in 2024.  she  denies any fracture history.  Risk factors include age, postmenopausal, TSH suppression  Patient follows with PCP and will be starting fosamax once dental procedures are complete  She will reach out to me if she has any further questions  DXA due 11/2026       Return in about 6 months (around 10/3/2025).    The above plan was discussed in detail with the patient who verbalized understanding and agreement.      Shahla Duval DO  [facility]  4/3/2025     In reviewing this note, please be advised that Dragon Voice Recognition software used to dictate the note may have made errors in recognizing some of the words or phrases.     Note to patient:  The 21 Century Cures Act makes medical notes like these available to patients in the interest of transparency. However, be advised this is a medical document. It is intended as peer to peer communication. It is written in medical language and may contain abbreviations or verbiage that are unfamiliar. It may appear blunt or direct. Medical documents are intended to carry relevant information, facts as evident, and the clinical opinion of the practitioner.

## 2025-04-09 NOTE — TELEPHONE ENCOUNTER
Patient sent a [facility] asking if she needs to be fasting for her labs (Calcium, TSH, Free T4, PTH, ANTITHYROGLOBULIN AB, and QN THYROGLOBULIN W/O TGAB) tomorrow. Patient also phoned Roe Armendariz in [address] [facility]. asking this same question. Roe Armendariz phoned RN; RN told Kadeem Falling that a [facility] will be sent to patient answering her question. RN just waiting for Dr. Dale Liao response (if pt needs to be fasting or not). Per Dr. Andi Chauhan, patient does not need to be fasting for thyroglobulins labs draw. Issac Lipoma [facility] sent. Will close encounter. Satisfactory

## 2025-04-13 LAB
BUN: 23 MG/DL (ref 7–25)
CREATININE: 1.08 MG/DL (ref 0.5–1.05)
EGFR: 58 ML/MIN/1.73M2

## 2025-06-06 DIAGNOSIS — J30.9 ALLERGIC RHINITIS, UNSPECIFIED SEASONALITY, UNSPECIFIED TRIGGER: ICD-10-CM

## 2025-06-06 RX ORDER — FLUTICASONE PROPIONATE 50 MCG
1 SPRAY, SUSPENSION (ML) NASAL DAILY
Qty: 3 EACH | Refills: 1 | Status: SHIPPED | OUTPATIENT
Start: 2025-06-06

## 2025-06-06 NOTE — TELEPHONE ENCOUNTER
Allergy Medication Protocol Fuiiyn2706/06/2025 07:42 AM   Protocol Details In person appointment or virtual visit in the past 12 mos or appointment in next 3 mos    Medication is active on med list        Future Appointments   Date Time Provider Department Center   9/3/2025  8:20 AM Lor Coy MD [facility] [place]   9/22/2025  4:00 PM PF US RM3 PF US [place]   10/7/2025  4:00 PM Shahla Duval DO [id number] [place]

## 2025-06-21 ENCOUNTER — OFFICE VISIT (OUTPATIENT)
Dept: INTERNAL MEDICINE CLINIC | Facility: CLINIC | Age: 65
End: 2025-06-21
Payer: COMMERCIAL

## 2025-06-21 VITALS
OXYGEN SATURATION: 98 % | DIASTOLIC BLOOD PRESSURE: 70 MMHG | WEIGHT: 146.19 LBS | TEMPERATURE: 97 F | RESPIRATION RATE: 16 BRPM | SYSTOLIC BLOOD PRESSURE: 120 MMHG | HEART RATE: 61 BPM | HEIGHT: 62.75 IN | BODY MASS INDEX: 26.23 KG/M2

## 2025-06-21 DIAGNOSIS — G44.52 NEW PERSISTENT DAILY HEADACHE: Primary | ICD-10-CM

## 2025-06-21 PROCEDURE — 99214 OFFICE O/P EST MOD 30 MIN: CPT | Performed by: INTERNAL MEDICINE

## 2025-06-21 NOTE — PROGRESS NOTES
[facility]    CHIEF COMPLAINT:    Chief Complaint   Patient presents with    Migraine     Going on for 2 weeks - Center of Head and on both side of head in center area and lower back of head, has had mild relief here and there but never completely, feels there is sensitivity triggers    Ears also feel clogged since started    Stiffness     Neck - going on for a couple weeks, comes and goes              The following individual(s) verbally consented to be recorded using ambient AI listening technology and understand that they can each withdraw their consent to this listening technology at any point by asking the clinician to turn off or pause the recording:    Patient name: Diana Perales  Additional names:  none          HISTORY OF PRESENT ILLNESS:  Complains of headache for 2 weeks.     History of Present Illness  She has been experiencing persistent headaches for the past two weeks, differing from her typical migraines. The headaches are located at the crown of her head, extending from one ear to the other, and feel like something is underneath the scalp. They are described as throbbing and sensitive, and have not resolved with her usual migraine management strategies. Her scalp is sensitive to touch when she has the headache. Pain is 4-5/10 in severity.     The headaches do not cause dizziness or lightheadedness, and she is able to function normally, including working on the computer and making decisions. She has been monitoring for stroke symptoms with her 's help, but has not experienced any numbness, tingling, or weakness. The pain remains localized to her head and does not radiate elsewhere.    She notes neck stiffness, which she attributes to possibly sleeping in the wrong position. The stiffness began around the same time as the headache. She has been dealing with increased stress over the past month, which she believes may be contributing to her symptoms. She has been using two pillows to  find a comfortable sleeping position, and notes that her headache improves when lying down.    She has tried Flonase and extra strength Tylenol for relief, but Tylenol worsened her headache one night, waking her from sleep. She has avoided Aleve and has been hydrating more. She typically manages her migraines by drinking fluids, avoiding alcohol, reducing salt intake, and resting in a dark room. She has not taken any prescribed medication for migraines.    The headache sometimes subsides, such as during a walk, but generally persists. She describes the headache as 'achy' and notes that it decreases somewhat but remains present. She has been tired, which she attributes to the draining nature of migraines, but denies nausea.    She has a history of mild shingles years ago, which she initially suspected might be related to her current symptoms. Her  noted a small red dot on her scalp, but she has not developed a rash consistent with shingles. She has been using a heating pad for relief and has considered the possibility of stress-related tension contributing to her symptoms.    She has been managing her stress and has been walking more as part of her routine. She has not started osteoporosis medication due to ongoing dental work. She mentions a thyroid issue from April, where a lymph node was found, but she does not believe it is related to her current headache.    She has been using an air conditioner to stay cool during hot weather and has been fasting for blood work.       REVIEW OF SYSTEMS:  See HPI    Current Medications:    Current Medications[1]    PAST MEDICAL, SOCIAL, AND FAMILY HISTORY:  Tobacco:    History   Smoking Status    Former    Types: Cigarettes   Smokeless Tobacco    Never       PHYSICAL EXAM:  /70 (BP Location: Right arm, Patient Position: Sitting, Cuff Size: adult)   Pulse 61   Temp 97.4 °F (36.3 °C) (Temporal)   Resp 16   Ht 5' 2.75\" (1.594 m)   Wt 146 lb 3.2 oz (66.3 kg)    SpO2 98%   BMI 26.10 kg/m²    GENERAL: well developed, well nourished,in no apparent distress  HEENT: oropharynx with some cobblestoning. TMs normal bilaterally. No tmj crepitus or tenderness.   EYES:PERRLA, EOMI, conjunctiva are clear  NECK: no lad. Has tightness of the paraspinal muscles of the neck and upper back muscles.   LUNGS: clear to auscultation  CARDIO: RRR without murmur  MUSCULOSKELETAL: back is not tender,FROM of the back  EXTREMITIES: no cyanosis, clubbing or edema  NEURO: Oriented times three, cranial nerves are intact, motor and sensory are grossly intact, DTR's are 2+, strength is 5+/5 and sensation is intact to pinprick, and finger to nose, heel to shin and Rhomberg are all wnl's    DATA:  Results for orders placed or performed in visit on 03/14/25   BUN    Collection Time: 04/12/25  7:20 AM   Result Value Ref Range    UREA NITROGEN (BUN) 23 7 - 25 mg/dL   Creatinine, Serum    Collection Time: 04/12/25  7:20 AM   Result Value Ref Range    CREATININE 1.08 (H) 0.50 - 1.05 mg/dL    EGFR 58 (L) > OR = 60 mL/min/1.73m2            ASSESSMENT AND PLAN:  Assessment & Plan  Chronic Headache  Persistent headache atypical for her usual migraines, associated with stress and neck stiffness. Differential includes tension headache, atypical migraine, and stress-related headache. No neurological deficits.   This is a new headache for a female pt over age 50. Persistent, throbbing at times. No typical migraine features.   - Order CBC, B12, and folic acid levels.  - Recommend Excedrin Migraine.  - Advise Claritin if allergy component present.  - Encourage hydration and stress reduction techniques.  - Order MRI of the brain.  - see neuro if no improvement and if work up negative, given referral today.   - Advise emergency room visit if severity increases to 7 or 8 out of 10.      Recording duration: 20 minutes       No follow-ups on file.      Lor Coy MD         [1]   Current Outpatient Medications    Medication Sig Dispense Refill    fluticasone propionate 50 MCG/ACT Nasal Suspension 1 spray by Each Nare route daily. 3 each 1    levothyroxine (SYNTHROID) 88 MCG Oral Tab Take 1 tablet (88 mcg total) by mouth before breakfast.      atenolol 50 MG Oral Tab TAKE 1 TABLET DAILY 90 tablet 1    lisinopril-hydroCHLOROthiazide 20-12.5 MG Oral Tab Take 1 tablet by mouth daily. 90 tablet 1    Multiple Vitamins-Minerals (MULTI VITAMIN/MINERALS) Oral Tab Take 1 tablet by mouth daily.      Calcium Carbonate-Vitamin D (CALCIUM + D OR) Take 2 tablets by mouth daily.

## 2025-06-22 LAB
ABSOLUTE BASOPHILS: 33 CELLS/UL (ref 0–200)
ABSOLUTE EOSINOPHILS: 91 CELLS/UL (ref 15–500)
ABSOLUTE LYMPHOCYTES: 1268 CELLS/UL (ref 850–3900)
ABSOLUTE MONOCYTES: 325 CELLS/UL (ref 200–950)
ABSOLUTE NEUTROPHILS: 4784 CELLS/UL (ref 1500–7800)
BASOPHILS: 0.5 %
EOSINOPHILS: 1.4 %
FOLATE, SERUM: >24 NG/ML
HEMATOCRIT: 40.9 % (ref 35–45)
HEMOGLOBIN: 13.2 G/DL (ref 11.7–15.5)
LYMPHOCYTES: 19.5 %
MCH: 31.3 PG (ref 27–33)
MCHC: 32.3 G/DL (ref 32–36)
MCV: 96.9 FL (ref 80–100)
MONOCYTES: 5 %
MPV: 12 FL (ref 7.5–12.5)
NEUTROPHILS: 73.6 %
PLATELET COUNT: 216 THOUSAND/UL (ref 140–400)
RDW: 11.7 % (ref 11–15)
RED BLOOD CELL COUNT: 4.22 MILLION/UL (ref 3.8–5.1)
VITAMIN B12: 892 PG/ML (ref 200–1100)
WHITE BLOOD CELL COUNT: 6.5 THOUSAND/UL (ref 3.8–10.8)

## 2025-06-28 DIAGNOSIS — C73 PRIMARY THYROID CANCER (HCC): ICD-10-CM

## 2025-06-28 DIAGNOSIS — E89.0 POST-SURGICAL HYPOTHYROIDISM: ICD-10-CM

## 2025-06-30 RX ORDER — LEVOTHYROXINE SODIUM 88 MCG
88 TABLET ORAL
Qty: 90 TABLET | Refills: 1 | Status: SHIPPED | OUTPATIENT
Start: 2025-06-30

## 2025-06-30 NOTE — TELEPHONE ENCOUNTER
Endocrine refill protocol for medications for hypothyroidism and hyperthyroidism    Protocol Criteria:  PASSED Reason: N/A    If all below requirements are met, send a 90-day supply with 1 refill per provider protocol.    Verify appointment with Endocrinology completed in the last 12 months or scheduled in the next 6 months.    Normal TSH result in the past 12 months   Review recent telephone encounters and mychart communications with patient to ensure a dose change has not occurred since last office visit that was not updated in the medication history list     Last completed office visit:4/3/2025 Shahla Duval DO   Last completed telemed visit: Visit date not found  Next scheduled Follow up:   Future Appointments   Date Time Provider Department Center   7/19/2025  7:45 AM PF MRI RM1 (1.5T) PF MRI [place]   7/22/2025  8:40 AM Surjit Pitts MD [facility] [place]   9/3/2025  8:20 AM Lor Coy MD [facility] [place]   9/22/2025  4:00 PM PF US RM3 PF US [place]   10/7/2025  4:00 PM Shahla Duval DO [id number] [facility] [place]      Last TSH result:   TSH   Date Value Ref Range Status   03/12/2025 0.42 0.40 - 4.50 mIU/L Final

## 2025-07-20 ENCOUNTER — HOSPITAL ENCOUNTER (OUTPATIENT)
Dept: MRI IMAGING | Facility: HOSPITAL | Age: 65
End: 2025-07-20
Attending: INTERNAL MEDICINE
Payer: COMMERCIAL

## 2025-07-20 ENCOUNTER — HOSPITAL ENCOUNTER (OUTPATIENT)
Dept: MRI IMAGING | Facility: HOSPITAL | Age: 65
Discharge: HOME | End: 2025-07-20
Attending: INTERNAL MEDICINE
Payer: COMMERCIAL

## 2025-07-20 DIAGNOSIS — G44.52 NEW PERSISTENT DAILY HEADACHE: ICD-10-CM

## 2025-07-20 PROCEDURE — 70551 MRI BRAIN STEM W/O DYE: CPT | Performed by: INTERNAL MEDICINE

## 2025-07-22 ENCOUNTER — OFFICE VISIT (OUTPATIENT)
Dept: NEUROLOGY | Facility: CLINIC | Age: 65
End: 2025-07-22
Payer: COMMERCIAL

## 2025-07-22 VITALS
WEIGHT: 146 LBS | SYSTOLIC BLOOD PRESSURE: 122 MMHG | HEIGHT: 62.75 IN | DIASTOLIC BLOOD PRESSURE: 54 MMHG | RESPIRATION RATE: 16 BRPM | OXYGEN SATURATION: 100 % | BODY MASS INDEX: 26.19 KG/M2 | HEART RATE: 57 BPM

## 2025-07-22 DIAGNOSIS — G43.709 CHRONIC MIGRAINE WITHOUT AURA WITHOUT STATUS MIGRAINOSUS, NOT INTRACTABLE: ICD-10-CM

## 2025-07-22 DIAGNOSIS — G44.84 EXERTIONAL HEADACHE: ICD-10-CM

## 2025-07-22 DIAGNOSIS — G44.52 NEW DAILY PERSISTENT HEADACHE: Primary | ICD-10-CM

## 2025-07-22 PROCEDURE — 99204 OFFICE O/P NEW MOD 45 MIN: CPT | Performed by: OTHER

## 2025-07-22 RX ORDER — TOPIRAMATE 25 MG/1
TABLET, FILM COATED ORAL
Qty: 120 TABLET | Refills: 2 | Status: SHIPPED | OUTPATIENT
Start: 2025-07-22

## 2025-07-22 RX ORDER — METHYLPREDNISOLONE 4 MG/1
TABLET ORAL
Qty: 1 EACH | Refills: 0 | Status: SHIPPED | OUTPATIENT
Start: 2025-07-22

## 2025-07-22 RX ORDER — LORATADINE 10 MG/1
CAPSULE, LIQUID FILLED ORAL
COMMUNITY
Start: 2025-06-14

## 2025-07-22 NOTE — PATIENT INSTRUCTIONS
Refill policies:    Allow 2-3 business days for refills; controlled substances may take longer.  Contact your pharmacy at least 5 days prior to running out of medication and have them send an electronic request or submit request through the “request refill” option in your [employer] account.  Refills are not addressed on weekends; covering physicians do not authorize routine medications on weekends.  No narcotics or controlled substances are refilled after noon on Fridays or by on call physicians.  By law, narcotics must be electronically prescribed.  A 30 day supply with no refills is the maximum allowed.  If your prescription is due for a refill, you may be due for a follow up appointment.  To best provide you care, patients receiving routine medications need to be seen at least once a year.  Patients receiving narcotic/controlled substance medications need to be seen at least once every 3 months.  In the event that your preferred pharmacy does not have the requested medication in stock (e.g. Backordered), it is your responsibility to find another pharmacy that has the requested medication available.  We will gladly send a new prescription to that pharmacy at your request.    Scheduling Tests:    If your physician has ordered radiology tests such as MRI or CT scans, please contact Central Scheduling at [phone] right away to schedule the test.  Once scheduled, the [facility] Centralized Referral Team will work with your insurance carrier to obtain pre-certification or prior authorization.  Depending on your insurance carrier, approval may take 3-10 days.  It is highly recommended patients assure they have received an authorization before having a test performed.  If test is done without insurance authorization, patient may be responsible for the entire amount billed.      Precertification and Prior Authorizations:  If your physician has recommended that you have a procedure or additional testing performed the [facility]  Centralized Referral Team will contact your insurance carrier to obtain pre-certification or prior authorization.    You are strongly encouraged to contact your insurance carrier to verify that your procedure/test has been approved and is a COVERED benefit.  Although the [facility] Centralized Referral Team does its due diligence, the insurance carrier gives the disclaimer that \"Although the procedure is authorized, this does not guarantee payment.\"    Ultimately the patient is responsible for payment.   Thank you for your understanding in this matter.  Paperwork Completion:  If you require FMLA or disability paperwork for your recovery, please make sure to either drop it off or have it faxed to our office at [phone]. Be sure the form has your name and date of birth on it.  The form will be faxed to our Forms Department and they will complete it for you.  There is a 25$ fee for all forms that need to be filled out.  Please be aware there is a 10-14 day turnaround time.  You will need to sign a release of information (JORGE) form if your paperwork does not come with one.  You may call the Forms Department with any questions at [phone].  Their fax number is [phone].

## 2025-07-22 NOTE — H&P
[facility] New Patient / Consult Visit    Diana Perales is a 64 year old female.                         Referring MD: Lor Coy    Chief Complaint   Patient presents with    Headache     NP- daily headaches since may. Starts from the back of head and goes from ear to ear. Pt reports stiffness in the neck. MRI on 7/20/25       HPI:    Diana Perales is a 64 year old, who presents for evaluation of new onset headache.  She states she began to notice pain in the side of the head, which started on the left side above the ear / temporal region and then feels like a \"cap\" over the head since ~ May 2025. She denies any associated nausea but has some light sensitivity and occasional sound sensitivity. She also notes worsening with stress and exertion. 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 She denies pain radiating from the neck to behind the eyes or down the shoulder or into the arms. She denies double vision, difficulty swallowing, pain with moving eyes from side to side or focal numbness/tingling/weakness. She denies vertigo.     She has noted headaches upon awakening in the morning but denies worsening with cough / sneeze or with having bowel movement. She denies balance issues, falls. She cannot identify any dietary triggers. She has been trying to avoid alcohol but had a small glass of wine the other day, which did trigger her headaches. She also has had a lot of sneezing and drainage and feels like she has pressure in the right side of the ear; no hearing loss or ringing in ears; she mentions she has a \"pulsating sensation\" over the crown of her head; no head trauma but known sensitivity over this region. Headache gets up to 7 out of 10 but only lasts this level for ~ 20 minutes - overall improves in quiet and with deep breathing or with heating pad on neck.     She thinks relatively constant level of 5 out of  10     She has history of \"migraines\" for years but has never been told to try any migraine specific medications; she usually has symptoms of pain in the back of the neck or behind the eyes; this may occur only once every other month.       Otherwise, patient denies any recent weight change, fevers, chills, nausea, double vision/ blurry vision / loss of vision, chest pain, palpitations, shortness of breath, rashes, joint pains, bowel / bladder incontinence or mood issues.     Past Medical History[1]  Past Surgical History[2]  Short Social Hx on File[3]  Family History[4]    Allergies:  Allergies[5]   Current Meds:  Current Medications[6]       ROS:   A comprehensive 10 point review of systems was completed.  Pertinent positives and negatives noted in the the HPI.      PHYSICAL EXAM:   /54 (BP Location: Left arm, Patient Position: Sitting, Cuff Size: adult)   Pulse 57   Resp 16   Ht 62.75\"   Wt 146 lb (66.2 kg)   SpO2 100%   BMI 26.07 kg/m²   Estimated body mass index is 26.07 kg/m² as calculated from the following:    Height as of this encounter: 62.75\".    Weight as of this encounter: 146 lb (66.2 kg).    GENERAL: well developed, well nourished, in no apparent distress  SKIN: no rashes  EYES: sclera anicteric, conjunctiva normal  HEENT: normocephalic  CARDIOVASCULAR: S1, S2 normal, RRR  LUNGS: clear to auscultation bilaterally  EXTREMITIES: no cyanosis, peripheral pulses intact    Neck: Supple; full range of motion; no carotid bruits    Mental status:  Alert and oriented to time, place, person, and situation  Speech: fluent  Language: normal naming, repetition, and comprehension  Memory: normal  Attention/concentration: normal    Fundoscopic Exam: optic discs sharp bilaterally    Cranial Nerves: II through XII  Optic:    Pupils: equally round and reactive to light with direct and consensual responses, normal accomodation   Visual acuity: Normal              Visual fields:  Normal  Oculomotor/Trochlear/Abducens:    Eye Movements: EOMI without nystagmus  Trigeminal:   Facial sensation:intact to light touch bilaterally  Facial:   Smile symmetric, eyebrow raise symmetric  Vestibulocochlear:   Hearing: normal bilaterally  Glossopharyngeal/Vagus:   Palate elevates symmetrically with midline uvula  Spinal accessory:   Shoulder Shrug: normal bilaterally   Lateral head turn: normal bilaterally  Hypoglossal:   Tongue movement: protrusion is midline with normal lateral movements    Motor System:  Strength: 5/5 throughout  Tone: normal    Sensory:  Pin is normal  Vibration is normal  Proprioception is normal  Romberg is absent    Coordination:  Finger to nose normal bilaterally  Rapid alternating movements normal bilaterally  Heel to shin is normal bilaterally    DTRs:   2+, symmetric, throughout, toes downgoing bilaterally; no clonus          Gait:  Normal casual, heel, toe and tandem gait    TEST RESULTS/DATA REVIEWED:   Imaging    MRI BRAIN (CPT=70551)  Result Date: 7/20/2025  CONCLUSION: 1. There is a small amount of free fluid in the right maxillary sinus which could indicate sinusitis. 2. MRI brain is otherwise unremarkable. Electronically Verified and Signed by Attending Radiologist: Iron Fletcher MD 7/20/2025 11:58 AM Workstation: [id number]        IMPRESSION AND PLAN:   Diana Perales is a 64 year old female who presents for evaluation of new onset headache.  She states she began to notice pain in the side of the head, which started on the left side above the ear / temporal region and then feels like a \"cap\" over the head since ~ May 2025. She denies any associated nausea but has some light sensitivity and occasional sound sensitivity. She also notes worsening with stress and exertion. 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 She denies pain radiating from the neck to behind the eyes or down the  shoulder or into the arms. She denies double vision, difficulty swallowing, pain with moving eyes from side to side or focal numbness/tingling/weakness. She denies vertigo.     She has noted headaches upon awakening in the morning but denies worsening with cough / sneeze or with having bowel movement. She denies balance issues, falls. She cannot identify any dietary triggers. She has been trying to avoid alcohol but had a small glass of wine the other day, which did trigger her headaches. She also has had a lot of sneezing and drainage and feels like she has pressure in the right side of the ear; no hearing loss or ringing in ears; she mentions she has a \"pulsating sensation\" over the crown of her head; no head trauma but known sensitivity over this region. Headache gets up to 7 out of 10 but only lasts this level for ~ 20 minutes - overall improves in quiet and with deep breathing or with heating pad on neck.     She thinks relatively constant level of 5 out of 10     She has history of \"migraines\" for years but has never been told to try any migraine specific medications; she usually has symptoms of pain in the back of the neck or behind the eyes; this may occur only once every other month.       Neurologic exam is normal and non-focal. Description of headaches appears somewhat aytpical for migraine and given age and new onset headache, evaluation for secondary etiology is warranted. She as already had MRI brain which did not show any significant intracranial pathology only showing mild R maxillary sinus free fluid level but no mass, hemorrhage, or ischemia. With her continued feeling of \"pulsatile sensation\" over the crown of the head, however, as well as some worsening on waking in the AM and with exertion, recommend CTA Brain / carotids to evaluate for vascular pathology (ie aneurysm, stenosis, vascular malformation and/or venous sinus thrombosis); will also check sed rate for possible temporal arteritis given  age and new onset headache.       In the meantime, given daily headaches, she warrants treatment and recommend first treat as possible intractable migraine - will give steroid burst with medrol dose pack and start preventive medication with topiramate (Topamax) as follows:  start at 25 mg nightly x1 week, then increase to 50 mg nightly x1 week, then 25 mg AM / 50 mg PM x1 week, then 50 mg bid; advised of potential side effects, including but not limited to, cognitive slowing / word finding difficulty, weight loss, decreased appetite, excessive sedation, paresthesias, and increased risk of nephrolithiasis, with need to maintain adequate hydration on the medication       1. New daily persistent headache  As noted above   - methylPREDNISolone (MEDROL) 4 MG Oral Tablet Therapy Pack; Take as directed  Dispense: 1 each; Refill: 0  - CTA BRAIN + CTA CAROTIDS (CPT=70496/06970); Future  - topiramate 25 MG Oral Tab; start at 25 mg nightly x1 week, then increase to 50 mg nightly x1 week, then 25 mg AM / 50 mg PM x1 week, then 50 mg bid  Dispense: 120 tablet; Refill: 2  - Sed Rate, Shivamergren (Automated)    2. Exertional headache  As noted above   - CTA BRAIN + CTA CAROTIDS (CPT=70496/78275); Future    3. Chronic migraine without aura without status migrainosus, not intractable  As noted above   - methylPREDNISolone (MEDROL) 4 MG Oral Tablet Therapy Pack; Take as directed  Dispense: 1 each; Refill: 0  - topiramate 25 MG Oral Tab; start at 25 mg nightly x1 week, then increase to 50 mg nightly x1 week, then 25 mg AM / 50 mg PM x1 week, then 50 mg bid  Dispense: 120 tablet; Refill: 2    Return in about 3 months (around 10/22/2025).    Copy of note was sent to referring physician.      Surjti Pitts MD, Neurology  [facility]  Pager [phone]  7/22/2025           [1]   Past Medical History:   Allergic rhinitis    Arthritis    Diagnosed Osteopena    Cancer (HCC)    Diagnosed July 2023    Disorder of thyroid     Essential hypertension    High blood pressure    History of COVID-19    Pt had cough , sore throat and fatigue x 2 days.    Hx of motion sickness    Migraines    Multiple benign lumps of breast    Osteoarthritis    Osteopenia    Osteopenia    Primary thyroid cancer (HCC)    Visual impairment    glasses   [2]   Past Surgical History:  Procedure Laterality Date    Colonoscopy  2022    Hysterectomy      Partial hysterectomy, still has cervix    Nicolle biopsy stereo nodule 1 site left (cpt=19081)      Nicolle localization wire 1 site left (cpt=19281)      Nicolle localization wire 1 site right (cpt=19281)      Needle biopsy right Right     benign bx          Thyroidectomy Bilateral 2023   [3]   Social History  Socioeconomic History    Marital status:     Number of children: 2   Tobacco Use    Smoking status: Former     Current packs/day: 0.00     Average packs/day: 0.3 packs/day for 2.0 years (0.5 ttl pk-yrs)     Types: Cigarettes     Quit date: 1985     Years since quittin.5     Passive exposure: Never    Smokeless tobacco: Never    Tobacco comments:     smoked socially in my early 's and not since then; back in early 's..   Vaping Use    Vaping status: Never Used   Substance and Sexual Activity    Alcohol use: Not Currently    Drug use: Never    Sexual activity: Yes     Partners: Male   Other Topics Concern    Exercise Yes     Comment: 3x weekly    Seat Belt Yes    Stress Concern Yes    Caffeine Concern Yes     Comment: 1-2 cups coffee    Special Diet No    Weight Concern No     Service No    Blood Transfusions No    Occupational Exposure No    Hobby Hazards No    Sleep Concern No    Back Care Yes    Bike Helmet Yes    Self-Exams Yes     Social Drivers of Health     Food Insecurity: No Food Insecurity (3/10/2025)    NCSS - Food Insecurity     Worried About Running Out of Food in the Last Year: No     Ran Out of Food in the Last Year: No   Transportation Needs: No Transportation Needs  (3/10/2025)    NCSS - Transportation     Lack of Transportation: No   Housing Stability: Not At Risk (3/10/2025)    NCSS - Housing/Utilities     Has Housing: Yes     Worried About Losing Housing: No     Unable to Get Utilities: No   [4]   Family History  Problem Relation Age of Onset    Kidney Cancer Father     Hypertension Father     Cancer Father         kidney, skin    Osteoporosis Mother     Hypertension Mother     Dementia Mother         began 3 years ago    Stroke Mother     No Known Problems Daughter     No Known Problems Son     Hypertension Maternal Grandmother     Stroke Maternal Grandmother     Diabetes Maternal Grandmother         Treated with Insulin and diet    Other (Emphysema) Maternal Grandfather     Other (Stomach cancer) Paternal Grandmother     Cancer Paternal Grandmother         stomach    No Known Problems Paternal Grandfather     No Known Problems Sister     Other (Kidney Stones) Sister     Other (Lupus) Sister     Other (Kidney Stones) Brother     Hypertension Brother     Breast Cancer Maternal Aunt 60    Breast Cancer Paternal Aunt 60    Genetic Disease Other    [5]   Allergies  Allergen Reactions    Codeine DIZZINESS    Seasonal OTHER (SEE COMMENTS)     Migraines, Sinus Issues   [6]   Current Outpatient Medications   Medication Sig Dispense Refill    Loratadine (CLARITIN) 10 MG Oral Cap       methylPREDNISolone (MEDROL) 4 MG Oral Tablet Therapy Pack Take as directed 1 each 0    topiramate 25 MG Oral Tab start at 25 mg nightly x1 week, then increase to 50 mg nightly x1 week, then 25 mg AM / 50 mg PM x1 week, then 50 mg bid 120 tablet 2    SYNTHROID 88 MCG Oral Tab TAKE 1 TABLET BEFORE       BREAKFAST 90 tablet 1    fluticasone propionate 50 MCG/ACT Nasal Suspension 1 spray by Each Nare route daily. 3 each 1    atenolol 50 MG Oral Tab TAKE 1 TABLET DAILY 90 tablet 1    lisinopril-hydroCHLOROthiazide 20-12.5 MG Oral Tab Take 1 tablet by mouth daily. 90 tablet 1    Multiple Vitamins-Minerals  (MULTI VITAMIN/MINERALS) Oral Tab Take 1 tablet by mouth daily.      Calcium Carbonate-Vitamin D (CALCIUM + D OR) Take 2 tablets by mouth daily.

## 2025-07-24 LAB — SED RATE BY MODIFIED$WESTERGREN: 22 MM/H

## 2025-08-06 ENCOUNTER — HOSPITAL ENCOUNTER (OUTPATIENT)
Dept: CT IMAGING | Facility: HOSPITAL | Age: 65
Discharge: HOME | End: 2025-08-06
Attending: Other

## 2025-08-06 DIAGNOSIS — G44.84 EXERTIONAL HEADACHE: ICD-10-CM

## 2025-08-06 DIAGNOSIS — G44.52 NEW DAILY PERSISTENT HEADACHE: ICD-10-CM

## 2025-08-06 LAB
CREAT BLD-MCNC: 1.4 MG/DL (ref 0.55–1.02)
EGFRCR SERPLBLD CKD-EPI 2021: 42 ML/MIN/1.73M2 (ref 60–?)

## 2025-08-06 PROCEDURE — 70496 CT ANGIOGRAPHY HEAD: CPT | Performed by: OTHER

## 2025-08-06 PROCEDURE — 70498 CT ANGIOGRAPHY NECK: CPT | Performed by: OTHER

## 2025-08-06 PROCEDURE — 82565 ASSAY OF CREATININE: CPT

## (undated) DEVICE — PREMIUM WET SKIN PREP TRAY: Brand: MEDLINE INDUSTRIES, INC.

## (undated) DEVICE — 3M™ TEGADERM™ TRANSPARENT FILM DRESSING, 1626W, 4 IN X 4-3/4 IN (10 CM X 12 CM), 50 EACH/CARTON, 4 CARTON/CASE: Brand: 3M™ TEGADERM™

## (undated) DEVICE — HEAD AND NECK CDS-LF: Brand: MEDLINE INDUSTRIES, INC.

## (undated) DEVICE — SUT PROLENE 4-0 PS-2 8682G

## (undated) DEVICE — SUT SILK 3-0 A184H

## (undated) DEVICE — SLEEVE KENDALL SCD EXPRESS MED

## (undated) DEVICE — SUT SILK 2-0 A305H

## (undated) DEVICE — SPONGE: SPECIALTY PEANUT XR 100/CS: Brand: MEDICAL ACTION INDUSTRIES

## (undated) DEVICE — PROBE 8225101 5PK STD PRASS FL TIP ROHS

## (undated) DEVICE — ELECTRODE ESURG 2.75IN EZ CLN

## (undated) DEVICE — SUT SILK 2-0 FS 685G

## (undated) DEVICE — GAUZE STERILE 4X4 12PLY

## (undated) DEVICE — 3M™ STERI-STRIP™ REINFORCED ADHESIVE SKIN CLOSURES, R1547, 1/2 IN X 4 IN (12 MM X 100 MM), 6 STRIPS/ENVELOPE: Brand: 3M™ STERI-STRIP™

## (undated) DEVICE — STERILE POLYISOPRENE POWDER-FREE SURGICAL GLOVES: Brand: PROTEXIS

## (undated) DEVICE — SPONGE RAYTEC 4X4 RF DETECT

## (undated) DEVICE — UNDYED BRAIDED (POLYGLACTIN 910), SYNTHETIC ABSORBABLE SUTURE: Brand: COATED VICRYL

## (undated) DEVICE — SUT VICRYL 3-0 SH J416H

## (undated) DEVICE — HARMONIC FOCUS SHEARS 9CM LENGTH + ADAPTIVE TISSUE TECHNOLOGY FOR USE WITH BLUE HAND PIECE ONLY: Brand: HARMONIC FOCUS

## (undated) DEVICE — SOL NACL IRRIG 0.9% 1000ML BTL

## (undated) DEVICE — PAD SACRAL PREMIUM 12X12X1

## (undated) DEVICE — HEMOSTAT ARISTA 1GRAM

## (undated) DEVICE — EMG TUBE 8229706 NIM TRIVANTAGE 6.0MM ID: Brand: NIM TRIVANTAGE™

## (undated) NOTE — LETTER
Date & Time: 7/14/2023, 9:56 AM  Patient: Ector Mack      To Whom It May Concern:    Nasir Keating was seen and treated in our department on 7/14/2023. She may return to work on 7/17/23. If you have any questions or concerns, please do not hesitate to call.

## (undated) NOTE — LETTER
07/23/25    Dear Dr. Lor Coy      Thank you for referring your patient, Diana Perales to me for an evaluation.  Please see my initial consult note enclosed below.  Let me know if you have any questions.    Thank you  Surjit Pitts MD, Neurology  [facility]  Pager [phone]  Office:    [address] [place] [id number]  [phone]      7/23/2025    [facility] New Patient / Consult Visit    Diana Perales is a 64 year old female.                         Referring MD: Lor Coy    Chief Complaint   Patient presents with    Headache     NP- daily headaches since may. Starts from the back of head and goes from ear to ear. Pt reports stiffness in the neck. MRI on 7/20/25       HPI:    Diana Perales is a 64 year old, who presents for evaluation of new onset headache.  She states she began to notice pain in the side of the head, which started on the left side above the ear / temporal region and then feels like a \"cap\" over the head since ~ May 2025. She denies any associated nausea but has some light sensitivity and occasional sound sensitivity. She also notes worsening with stress and exertion. 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 She denies pain radiating from the neck to behind the eyes or down the shoulder or into the arms. She denies double vision, difficulty swallowing, pain with moving eyes from side to side or focal numbness/tingling/weakness. She denies vertigo.     She has noted headaches upon awakening in the morning but denies worsening with cough / sneeze or with having bowel movement. She denies balance issues, falls. She cannot identify any dietary triggers. She has been trying to avoid alcohol but had a small glass of wine the other day, which did trigger her headaches. She also has had a lot of sneezing and drainage and feels like she has pressure  in the right side of the ear; no hearing loss or ringing in ears; she mentions she has a \"pulsating sensation\" over the crown of her head; no head trauma but known sensitivity over this region. Headache gets up to 7 out of 10 but only lasts this level for ~ 20 minutes - overall improves in quiet and with deep breathing or with heating pad on neck.     She thinks relatively constant level of 5 out of 10     She has history of \"migraines\" for years but has never been told to try any migraine specific medications; she usually has symptoms of pain in the back of the neck or behind the eyes; this may occur only once every other month.       Otherwise, patient denies any recent weight change, fevers, chills, nausea, double vision/ blurry vision / loss of vision, chest pain, palpitations, shortness of breath, rashes, joint pains, bowel / bladder incontinence or mood issues.     [Past Medical History]    [Past Medical History]   Allergic rhinitis    Arthritis    Diagnosed Osteopena    Cancer (HCC)    Diagnosed 2023    Disorder of thyroid    Essential hypertension    High blood pressure    History of COVID-19    Pt had cough , sore throat and fatigue x 2 days.    Hx of motion sickness    Migraines    Multiple benign lumps of breast    Osteoarthritis    Osteopenia    Osteopenia    Primary thyroid cancer (HCC)    Visual impairment    glasses     [Past Surgical History]    [Past Surgical History]  Procedure Laterality Date    Colonoscopy  2022    Hysterectomy      Partial hysterectomy, still has cervix    Nicolle biopsy stereo nodule 1 site left (cpt=19081)      Nicolle localization wire 1 site left (cpt=19281)      Nicolle localization wire 1 site right (cpt=19281)      Needle biopsy right Right     benign bx          Thyroidectomy Bilateral 2023     [Short Social Hx on File]    [Short Social Hx on File]  Social History  Socioeconomic History    Marital status:     Number of children: 2   Tobacco Use    Smoking  status: Former     Current packs/day: 0.00     Average packs/day: 0.3 packs/day for 2.0 years (0.5 ttl pk-yrs)     Types: Cigarettes     Quit date: 1985     Years since quittin.5     Passive exposure: Never    Smokeless tobacco: Never    Tobacco comments:     smoked socially in my early 20's and not since then; back in early s..   Vaping Use    Vaping status: Never Used   Substance and Sexual Activity    Alcohol use: Not Currently    Drug use: Never    Sexual activity: Yes     Partners: Male   Other Topics Concern    Exercise Yes     Comment: 3x weekly    Seat Belt Yes    Stress Concern Yes    Caffeine Concern Yes     Comment: 1-2 cups coffee    Special Diet No    Weight Concern No     Service No    Blood Transfusions No    Occupational Exposure No    Hobby Hazards No    Sleep Concern No    Back Care Yes    Bike Helmet Yes    Self-Exams Yes     Social Drivers of Health     Food Insecurity: No Food Insecurity (3/10/2025)    NCSS - Food Insecurity     Worried About Running Out of Food in the Last Year: No     Ran Out of Food in the Last Year: No   Transportation Needs: No Transportation Needs (3/10/2025)    NCSS - Transportation     Lack of Transportation: No   Housing Stability: Not At Risk (3/10/2025)    NCSS - Housing/Utilities     Has Housing: Yes     Worried About Losing Housing: No     Unable to Get Utilities: No     [Family History]    [Family History]  Problem Relation Age of Onset    Kidney Cancer Father     Hypertension Father     Cancer Father         kidney, skin    Osteoporosis Mother     Hypertension Mother     Dementia Mother         began 3 years ago    Stroke Mother     No Known Problems Daughter     No Known Problems Son     Hypertension Maternal Grandmother     Stroke Maternal Grandmother     Diabetes Maternal Grandmother         Treated with Insulin and diet    Other (Emphysema) Maternal Grandfather     Other (Stomach cancer) Paternal Grandmother     Cancer Paternal  Grandmother         stomach    No Known Problems Paternal Grandfather     No Known Problems Sister     Other (Kidney Stones) Sister     Other (Lupus) Sister     Other (Kidney Stones) Brother     Hypertension Brother     Breast Cancer Maternal Aunt 60    Breast Cancer Paternal Aunt 60    Genetic Disease Other        Allergies:  [Allergies]    [Allergies]  Allergen Reactions    Codeine DIZZINESS    Seasonal OTHER (SEE COMMENTS)     Migraines, Sinus Issues        Current Meds:  [Current Medications]    [Current Medications]  Current Outpatient Medications   Medication Sig Dispense Refill    Loratadine (CLARITIN) 10 MG Oral Cap       methylPREDNISolone (MEDROL) 4 MG Oral Tablet Therapy Pack Take as directed 1 each 0    topiramate 25 MG Oral Tab start at 25 mg nightly x1 week, then increase to 50 mg nightly x1 week, then 25 mg AM / 50 mg PM x1 week, then 50 mg bid 120 tablet 2    SYNTHROID 88 MCG Oral Tab TAKE 1 TABLET BEFORE       BREAKFAST 90 tablet 1    fluticasone propionate 50 MCG/ACT Nasal Suspension 1 spray by Each Nare route daily. 3 each 1    atenolol 50 MG Oral Tab TAKE 1 TABLET DAILY 90 tablet 1    lisinopril-hydroCHLOROthiazide 20-12.5 MG Oral Tab Take 1 tablet by mouth daily. 90 tablet 1    Multiple Vitamins-Minerals (MULTI VITAMIN/MINERALS) Oral Tab Take 1 tablet by mouth daily.      Calcium Carbonate-Vitamin D (CALCIUM + D OR) Take 2 tablets by mouth daily.              ROS:   A comprehensive 10 point review of systems was completed.  Pertinent positives and negatives noted in the the HPI.      PHYSICAL EXAM:   /54 (BP Location: Left arm, Patient Position: Sitting, Cuff Size: adult)   Pulse 57   Resp 16   Ht 62.75\"   Wt 146 lb (66.2 kg)   SpO2 100%   BMI 26.07 kg/m²   Estimated body mass index is 26.07 kg/m² as calculated from the following:    Height as of this encounter: 62.75\".    Weight as of this encounter: 146 lb (66.2 kg).    GENERAL: well developed, well nourished, in no apparent  distress  SKIN: no rashes  EYES: sclera anicteric, conjunctiva normal  HEENT: normocephalic  CARDIOVASCULAR: S1, S2 normal, RRR  LUNGS: clear to auscultation bilaterally  EXTREMITIES: no cyanosis, peripheral pulses intact    Neck: Supple; full range of motion; no carotid bruits    Mental status:  Alert and oriented to time, place, person, and situation  Speech: fluent  Language: normal naming, repetition, and comprehension  Memory: normal  Attention/concentration: normal    Fundoscopic Exam: optic discs sharp bilaterally    Cranial Nerves: II through XII  Optic:    Pupils: equally round and reactive to light with direct and consensual responses, normal accomodation   Visual acuity: Normal              Visual fields: Normal  Oculomotor/Trochlear/Abducens:    Eye Movements: EOMI without nystagmus  Trigeminal:   Facial sensation:intact to light touch bilaterally  Facial:   Smile symmetric, eyebrow raise symmetric  Vestibulocochlear:   Hearing: normal bilaterally  Glossopharyngeal/Vagus:   Palate elevates symmetrically with midline uvula  Spinal accessory:   Shoulder Shrug: normal bilaterally   Lateral head turn: normal bilaterally  Hypoglossal:   Tongue movement: protrusion is midline with normal lateral movements    Motor System:  Strength: 5/5 throughout  Tone: normal    Sensory:  Pin is normal  Vibration is normal  Proprioception is normal  Romberg is absent    Coordination:  Finger to nose normal bilaterally  Rapid alternating movements normal bilaterally  Heel to shin is normal bilaterally    DTRs:   2+, symmetric, throughout, toes downgoing bilaterally; no clonus          Gait:  Normal casual, heel, toe and tandem gait    TEST RESULTS/DATA REVIEWED:   Imaging    MRI BRAIN (CPT=70551)  Result Date: 7/20/2025  CONCLUSION: 1. There is a small amount of free fluid in the right maxillary sinus which could indicate sinusitis. 2. MRI brain is otherwise unremarkable. Electronically Verified and Signed by Attending  Radiologist: Iron Fletcher MD 7/20/2025 11:58 AM Workstation: [id number]        IMPRESSION AND PLAN:   Diana Perales is a 64 year old female who presents for evaluation of new onset headache.  She states she began to notice pain in the side of the head, which started on the left side above the ear / temporal region and then feels like a \"cap\" over the head since ~ May 2025. She denies any associated nausea but has some light sensitivity and occasional sound sensitivity. She also notes worsening with stress and exertion. 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 She denies pain radiating from the neck to behind the eyes or down the shoulder or into the arms. She denies double vision, difficulty swallowing, pain with moving eyes from side to side or focal numbness/tingling/weakness. She denies vertigo.     She has noted headaches upon awakening in the morning but denies worsening with cough / sneeze or with having bowel movement. She denies balance issues, falls. She cannot identify any dietary triggers. She has been trying to avoid alcohol but had a small glass of wine the other day, which did trigger her headaches. She also has had a lot of sneezing and drainage and feels like she has pressure in the right side of the ear; no hearing loss or ringing in ears; she mentions she has a \"pulsating sensation\" over the crown of her head; no head trauma but known sensitivity over this region. Headache gets up to 7 out of 10 but only lasts this level for ~ 20 minutes - overall improves in quiet and with deep breathing or with heating pad on neck.     She thinks relatively constant level of 5 out of 10     She has history of \"migraines\" for years but has never been told to try any migraine specific medications; she usually has symptoms of pain in the back of the neck or behind the eyes; this may occur only once every other month.        Neurologic exam is normal and non-focal. Description of headaches appears somewhat aytpical for migraine and given age and new onset headache, evaluation for secondary etiology is warranted. She as already had MRI brain which did not show any significant intracranial pathology only showing mild R maxillary sinus free fluid level but no mass, hemorrhage, or ischemia. With her continued feeling of \"pulsatile sensation\" over the crown of the head, however, as well as some worsening on waking in the AM and with exertion, recommend CTA Brain / carotids to evaluate for vascular pathology (ie aneurysm, stenosis, vascular malformation and/or venous sinus thrombosis); will also check sed rate for possible temporal arteritis given age and new onset headache.       In the meantime, given daily headaches, she warrants treatment and recommend first treat as possible intractable migraine - will give steroid burst with medrol dose pack and start preventive medication with topiramate (Topamax) as follows:  start at 25 mg nightly x1 week, then increase to 50 mg nightly x1 week, then 25 mg AM / 50 mg PM x1 week, then 50 mg bid; advised of potential side effects, including but not limited to, cognitive slowing / word finding difficulty, weight loss, decreased appetite, excessive sedation, paresthesias, and increased risk of nephrolithiasis, with need to maintain adequate hydration on the medication       1. New daily persistent headache  As noted above   - methylPREDNISolone (MEDROL) 4 MG Oral Tablet Therapy Pack; Take as directed  Dispense: 1 each; Refill: 0  - CTA BRAIN + CTA CAROTIDS (CPT=70496/93977); Future  - topiramate 25 MG Oral Tab; start at 25 mg nightly x1 week, then increase to 50 mg nightly x1 week, then 25 mg AM / 50 mg PM x1 week, then 50 mg bid  Dispense: 120 tablet; Refill: 2  - Sed Rate, Westergren (Automated)    2. Exertional headache  As noted above   - CTA BRAIN + CTA CAROTIDS (CPT=70496/63626); Future    3.  Chronic migraine without aura without status migrainosus, not intractable  As noted above   - methylPREDNISolone (MEDROL) 4 MG Oral Tablet Therapy Pack; Take as directed  Dispense: 1 each; Refill: 0  - topiramate 25 MG Oral Tab; start at 25 mg nightly x1 week, then increase to 50 mg nightly x1 week, then 25 mg AM / 50 mg PM x1 week, then 50 mg bid  Dispense: 120 tablet; Refill: 2    Return in about 3 months (around 10/22/2025).    Copy of note was sent to referring physician.      Surjit Pitts MD, Neurology  [facility]  Pager [phone]  7/22/2025